# Patient Record
Sex: FEMALE | Race: WHITE | Employment: OTHER | URBAN - METROPOLITAN AREA
[De-identification: names, ages, dates, MRNs, and addresses within clinical notes are randomized per-mention and may not be internally consistent; named-entity substitution may affect disease eponyms.]

---

## 2024-04-01 ENCOUNTER — OFFICE VISIT (OUTPATIENT)
Dept: FAMILY MEDICINE CLINIC | Age: 69
End: 2024-04-01
Payer: MEDICARE

## 2024-04-01 VITALS
OXYGEN SATURATION: 98 % | DIASTOLIC BLOOD PRESSURE: 80 MMHG | WEIGHT: 163.4 LBS | BODY MASS INDEX: 32.08 KG/M2 | SYSTOLIC BLOOD PRESSURE: 138 MMHG | HEART RATE: 101 BPM | HEIGHT: 60 IN

## 2024-04-01 DIAGNOSIS — R19.5 POSITIVE COLORECTAL CANCER SCREENING USING COLOGUARD TEST: ICD-10-CM

## 2024-04-01 DIAGNOSIS — K21.9 GASTROESOPHAGEAL REFLUX DISEASE WITHOUT ESOPHAGITIS: ICD-10-CM

## 2024-04-01 DIAGNOSIS — Z11.59 ENCOUNTER FOR SCREENING FOR OTHER VIRAL DISEASES: ICD-10-CM

## 2024-04-01 DIAGNOSIS — I10 PRIMARY HYPERTENSION: ICD-10-CM

## 2024-04-01 DIAGNOSIS — F32.5 MAJOR DEPRESSIVE DISORDER WITH SINGLE EPISODE, IN FULL REMISSION (HCC): ICD-10-CM

## 2024-04-01 DIAGNOSIS — G25.81 RESTLESS LEG SYNDROME: ICD-10-CM

## 2024-04-01 DIAGNOSIS — E55.9 VITAMIN D DEFICIENCY: ICD-10-CM

## 2024-04-01 DIAGNOSIS — E78.2 MIXED HYPERLIPIDEMIA: ICD-10-CM

## 2024-04-01 DIAGNOSIS — Z85.3 HISTORY OF BREAST CANCER: ICD-10-CM

## 2024-04-01 DIAGNOSIS — E11.42 TYPE 2 DIABETES MELLITUS WITH DIABETIC POLYNEUROPATHY, WITHOUT LONG-TERM CURRENT USE OF INSULIN (HCC): Primary | ICD-10-CM

## 2024-04-01 PROBLEM — E11.9 TYPE 2 DIABETES MELLITUS WITHOUT COMPLICATION, WITHOUT LONG-TERM CURRENT USE OF INSULIN (HCC): Status: ACTIVE | Noted: 2024-04-01

## 2024-04-01 LAB — HBA1C MFR BLD: 9.6 %

## 2024-04-01 PROCEDURE — 1123F ACP DISCUSS/DSCN MKR DOCD: CPT | Performed by: FAMILY MEDICINE

## 2024-04-01 PROCEDURE — 3075F SYST BP GE 130 - 139MM HG: CPT | Performed by: FAMILY MEDICINE

## 2024-04-01 PROCEDURE — 3046F HEMOGLOBIN A1C LEVEL >9.0%: CPT | Performed by: FAMILY MEDICINE

## 2024-04-01 PROCEDURE — 99204 OFFICE O/P NEW MOD 45 MIN: CPT | Performed by: FAMILY MEDICINE

## 2024-04-01 PROCEDURE — 83036 HEMOGLOBIN GLYCOSYLATED A1C: CPT | Performed by: FAMILY MEDICINE

## 2024-04-01 PROCEDURE — 3079F DIAST BP 80-89 MM HG: CPT | Performed by: FAMILY MEDICINE

## 2024-04-01 RX ORDER — EMPAGLIFLOZIN 25 MG/1
10 TABLET, FILM COATED ORAL DAILY
COMMUNITY
Start: 2024-03-05 | End: 2024-04-01

## 2024-04-01 RX ORDER — VENLAFAXINE HYDROCHLORIDE 75 MG/1
75 CAPSULE, EXTENDED RELEASE ORAL EVERY EVENING
COMMUNITY
Start: 2024-01-23

## 2024-04-01 RX ORDER — METFORMIN HYDROCHLORIDE 500 MG/1
1000 TABLET, EXTENDED RELEASE ORAL 2 TIMES DAILY
COMMUNITY
Start: 2024-03-13

## 2024-04-01 RX ORDER — GLIPIZIDE 5 MG/1
5 TABLET ORAL
COMMUNITY
Start: 2024-03-12

## 2024-04-01 RX ORDER — ATORVASTATIN CALCIUM 20 MG/1
20 TABLET, FILM COATED ORAL NIGHTLY
COMMUNITY
Start: 2024-03-30

## 2024-04-01 RX ORDER — CHOLECALCIFEROL (VITAMIN D3) 1250 MCG
50000 CAPSULE ORAL DAILY
COMMUNITY
End: 2024-04-03 | Stop reason: SDUPTHER

## 2024-04-01 RX ORDER — GABAPENTIN 100 MG/1
100 CAPSULE ORAL
COMMUNITY
Start: 2024-03-12

## 2024-04-01 RX ORDER — ROPINIROLE 0.5 MG/1
0.5 TABLET, FILM COATED ORAL NIGHTLY
COMMUNITY
Start: 2024-03-07

## 2024-04-01 RX ORDER — FUROSEMIDE 20 MG/1
20 TABLET ORAL 2 TIMES DAILY
COMMUNITY
Start: 2024-01-30

## 2024-04-01 RX ORDER — ATORVASTATIN CALCIUM 10 MG/1
10 TABLET, FILM COATED ORAL NIGHTLY
COMMUNITY
Start: 2024-02-05 | End: 2024-04-01

## 2024-04-01 RX ORDER — GLUCOSAMINE HCL/CHONDROITIN SU 500-400 MG
CAPSULE ORAL
Qty: 100 STRIP | Refills: 3 | Status: SHIPPED | OUTPATIENT
Start: 2024-04-01

## 2024-04-01 RX ORDER — FAMOTIDINE 20 MG/1
20 TABLET, FILM COATED ORAL 2 TIMES DAILY PRN
Qty: 60 TABLET | Refills: 0 | Status: SHIPPED | OUTPATIENT
Start: 2024-04-01

## 2024-04-01 RX ORDER — FENOFIBRATE 67 MG/1
67 CAPSULE ORAL
COMMUNITY
Start: 2024-02-26

## 2024-04-01 RX ORDER — LANCETS 30 GAUGE
EACH MISCELLANEOUS
Qty: 100 EACH | Refills: 3 | Status: SHIPPED | OUTPATIENT
Start: 2024-04-01

## 2024-04-01 RX ORDER — FAMOTIDINE 20 MG/1
TABLET, FILM COATED ORAL
Qty: 180 TABLET | OUTPATIENT
Start: 2024-04-01

## 2024-04-01 RX ORDER — ANASTROZOLE 1 MG/1
1 TABLET ORAL DAILY
COMMUNITY
End: 2024-04-01 | Stop reason: ALTCHOICE

## 2024-04-01 RX ORDER — CAPTOPRIL 12.5 MG/1
12.5 TABLET ORAL DAILY
COMMUNITY
Start: 2024-01-11

## 2024-04-01 RX ORDER — PANTOPRAZOLE SODIUM 40 MG/1
40 TABLET, DELAYED RELEASE ORAL DAILY
COMMUNITY
Start: 2024-02-29 | End: 2024-04-01 | Stop reason: ALTCHOICE

## 2024-04-01 SDOH — ECONOMIC STABILITY: TRANSPORTATION INSECURITY
IN THE PAST 12 MONTHS, HAS LACK OF TRANSPORTATION KEPT YOU FROM MEETINGS, WORK, OR FROM GETTING THINGS NEEDED FOR DAILY LIVING?: NO

## 2024-04-01 SDOH — ECONOMIC STABILITY: HOUSING INSECURITY
IN THE LAST 12 MONTHS, WAS THERE A TIME WHEN YOU DID NOT HAVE A STEADY PLACE TO SLEEP OR SLEPT IN A SHELTER (INCLUDING NOW)?: NO

## 2024-04-01 SDOH — ECONOMIC STABILITY: INCOME INSECURITY: IN THE LAST 12 MONTHS, WAS THERE A TIME WHEN YOU WERE NOT ABLE TO PAY THE MORTGAGE OR RENT ON TIME?: NO

## 2024-04-01 SDOH — ECONOMIC STABILITY: FOOD INSECURITY: WITHIN THE PAST 12 MONTHS, THE FOOD YOU BOUGHT JUST DIDN'T LAST AND YOU DIDN'T HAVE MONEY TO GET MORE.: NEVER TRUE

## 2024-04-01 SDOH — ECONOMIC STABILITY: FOOD INSECURITY: WITHIN THE PAST 12 MONTHS, YOU WORRIED THAT YOUR FOOD WOULD RUN OUT BEFORE YOU GOT MONEY TO BUY MORE.: NEVER TRUE

## 2024-04-01 SDOH — HEALTH STABILITY: PHYSICAL HEALTH: ON AVERAGE, HOW MANY MINUTES DO YOU ENGAGE IN EXERCISE AT THIS LEVEL?: 0 MIN

## 2024-04-01 SDOH — HEALTH STABILITY: PHYSICAL HEALTH: ON AVERAGE, HOW MANY DAYS PER WEEK DO YOU ENGAGE IN MODERATE TO STRENUOUS EXERCISE (LIKE A BRISK WALK)?: 0 DAYS

## 2024-04-01 SDOH — ECONOMIC STABILITY: TRANSPORTATION INSECURITY
IN THE PAST 12 MONTHS, HAS THE LACK OF TRANSPORTATION KEPT YOU FROM MEDICAL APPOINTMENTS OR FROM GETTING MEDICATIONS?: NO

## 2024-04-01 ASSESSMENT — ENCOUNTER SYMPTOMS
TROUBLE SWALLOWING: 0
VOMITING: 0
BLOOD IN STOOL: 0
STRIDOR: 0
BACK PAIN: 1
COUGH: 0
RHINORRHEA: 0
COLOR CHANGE: 0
RECTAL PAIN: 0
CONSTIPATION: 0
SORE THROAT: 0
CHEST TIGHTNESS: 0
NAUSEA: 0
SHORTNESS OF BREATH: 0
SINUS PRESSURE: 0
ABDOMINAL PAIN: 0
WHEEZING: 0

## 2024-04-01 ASSESSMENT — LIFESTYLE VARIABLES
HOW OFTEN DO YOU HAVE A DRINK CONTAINING ALCOHOL: NEVER
HOW MANY STANDARD DRINKS CONTAINING ALCOHOL DO YOU HAVE ON A TYPICAL DAY: PATIENT DOES NOT DRINK

## 2024-04-01 ASSESSMENT — PATIENT HEALTH QUESTIONNAIRE - PHQ9
SUM OF ALL RESPONSES TO PHQ9 QUESTIONS 1 & 2: 0
1. LITTLE INTEREST OR PLEASURE IN DOING THINGS: NOT AT ALL
SUM OF ALL RESPONSES TO PHQ9 QUESTIONS 1 & 2: 0
SUM OF ALL RESPONSES TO PHQ QUESTIONS 1-9: 0
2. FEELING DOWN, DEPRESSED OR HOPELESS: NOT AT ALL
2. FEELING DOWN, DEPRESSED OR HOPELESS: NOT AT ALL
1. LITTLE INTEREST OR PLEASURE IN DOING THINGS: NOT AT ALL
SUM OF ALL RESPONSES TO PHQ QUESTIONS 1-9: 0

## 2024-04-01 ASSESSMENT — SOCIAL DETERMINANTS OF HEALTH (SDOH)
WITHIN THE LAST YEAR, HAVE YOU BEEN HUMILIATED OR EMOTIONALLY ABUSED IN OTHER WAYS BY YOUR PARTNER OR EX-PARTNER?: NO
WITHIN THE LAST YEAR, HAVE YOU BEEN KICKED, HIT, SLAPPED, OR OTHERWISE PHYSICALLY HURT BY YOUR PARTNER OR EX-PARTNER?: NO
HOW HARD IS IT FOR YOU TO PAY FOR THE VERY BASICS LIKE FOOD, HOUSING, MEDICAL CARE, AND HEATING?: NOT HARD AT ALL
WITHIN THE LAST YEAR, HAVE YOU BEEN AFRAID OF YOUR PARTNER OR EX-PARTNER?: NO
WITHIN THE LAST YEAR, HAVE TO BEEN RAPED OR FORCED TO HAVE ANY KIND OF SEXUAL ACTIVITY BY YOUR PARTNER OR EX-PARTNER?: NO

## 2024-04-01 NOTE — PATIENT INSTRUCTIONS
Dear valued patient,    We hope this message finds you in good health. At [ ], we are committed to providing you with the best possible healthcare experience. To further enhance your convenience and streamline your access to our services, we would like to introduce you to the benefits of utilizing direct scheduling through the [facility].    Direct scheduling is a feature within the [facility] that allows you to schedule appointments with your healthcare provider directly, without the need to make a phone call or wait for a callback. We understand that your time is jairo, and we want to ensure that you have quick and easy access to our services whenever you need them.  Here are some reasons why you should consider utilizing direct scheduling through the [facility]:    1. Convenience: With direct scheduling, you can book appointments at any time that suits you best, 24 hours a day, 7 days a week. No more waiting on hold or playing phone tag with our office staff. It puts you in control of managing your healthcare appointments effortlessly.    2. Accessibility: The [facility] is accessible through your computer, smartphone, or tablet, allowing you to schedule appointments from the comfort of your home, office, or on the go. You can access your medical records, review test results, and communicate with your healthcare provider all in one secure and user-friendly platform.    3. Timesaving: By utilizing direct scheduling, you can avoid unnecessary delays and save jairo time. You can easily browse the available appointment slots and select the one that works best for you, eliminating the back-and-forth communication typically required when scheduling via phone.    4. Reminders and notifications: [facility] sends automatic reminders for upcoming appointments, ensuring that you never miss an important visit. You can also receive notifications about test

## 2024-04-01 NOTE — PROGRESS NOTES
Chief Complaint   Patient presents with    New Patient    Other     COLOGUARD WAS ABNORMAL          Alma Strickland  here today for follow up on chronic medical problems, go over labs and/or diagnostic studies, and medication refills. New Patient and Other (COLOGUARD WAS ABNORMAL )      HPI: Patient is scheduled to establish.  Patient reports she recently more from [facility] 1 week before to live with her daughter.    The patient has multiple medical problems, type 2 diabetes uncontrolled A1c is 5.6 and reports it was 10.5 before.  She is currently on metformin, she is on glipizide and also on Jardiance.  Patient reports she has not started Jardiance yet, she is scared about the side effects.  Patient is compliant with metformin and glipizide does not monitor her blood sugars on regular basis.  Patient reports she needs a new monitor because of the previous monitor was not working.  She is up-to-date on eye and foot exam, needs to get the results from her previous PCP.      Hypertension controlled is currently on captopril, reports compliance denies any side effects.  Patient also takes Lasix 20 mg.        Patient is currently on gabapentin small dose for diabetic neuropathy, patient denies any side effects from medication.      Hyperlipidemia on statins and also on fenofibrate's no recent blood work.  Discussed with patient we might discontinue fenofibrate if lipid panel is normal.      Depression stable is currently on Effexor denies any side effects.  Patient reports compliance with medications.      Patient reports she has recent Cologuard test done and that was abnormal.  I could not find the results reports previously her Cologuard test results were normal.      Patient has history of breast malignancy, and has lumpectomy done.  Patient reports she was also on chemotherapy for long-term till 2016.  Patient has regular mammograms yearly.      Restless leg syndrome is currently on Requip.       GERD, patient

## 2024-04-01 NOTE — PROGRESS NOTES
Visit Information    Have you changed or started any medications since your last visit including any over-the-counter medicines, vitamins, or herbal medicines? no   Are you having any side effects from any of your medications? -  no  Have you stopped taking any of your medications? Is so, why? -  no    Have you seen any other physician or provider since your last visit? No  Have you had any other diagnostic tests since your last visit? No  Have you been seen in the emergency room and/or had an admission to a hospital since we last saw you? No  Have you had your routine dental cleaning in the past 6 months? yes     Have you activated your [employer] account? If not, what are your barriers? Yes     Patient Care Team:  Les Walker MD as PCP - General (Family Medicine)    Medical History Review  Past Medical, Family, and Social History reviewed and does contribute to the patient presenting condition    Health Maintenance   Topic Date Due    COVID-19 Vaccine (1) Never done    Lipids  Never done    Depression Screen  Never done    Hepatitis C screen  Never done    DTaP/Tdap/Td vaccine (1 - Tdap) Never done    Colorectal Cancer Screen  Never done    Breast cancer screen  Never done    Shingles vaccine (1 of 2) Never done    DEXA (modify frequency per FRAX score)  Never done    Respiratory Syncytial Virus (RSV) Pregnant or age 60 yrs+ (1 - 1-dose 60+ series) Never done    Pneumococcal 65+ years Vaccine (1 of 1 - PCV) Never done    Annual Wellness Visit (Medicare Advantage)  Never done    Flu vaccine (Season Ended) 08/01/2024    Hepatitis A vaccine  Aged Out    Hepatitis B vaccine  Aged Out    Hib vaccine  Aged Out    Polio vaccine  Aged Out    Meningococcal (ACWY) vaccine  Aged Out

## 2024-04-02 ENCOUNTER — TELEPHONE (OUTPATIENT)
Dept: ADMINISTRATIVE | Age: 69
End: 2024-04-02

## 2024-04-02 NOTE — TELEPHONE ENCOUNTER
Patient has a referral to get a colonoscopy scheduled from Dr Les Walker    Anyday or time is okay with patient    Please call

## 2024-04-03 ENCOUNTER — HOSPITAL ENCOUNTER (OUTPATIENT)
Age: 69
Discharge: HOME | End: 2024-04-03
Payer: MEDICARE

## 2024-04-03 DIAGNOSIS — E11.42 TYPE 2 DIABETES MELLITUS WITH DIABETIC POLYNEUROPATHY, WITHOUT LONG-TERM CURRENT USE OF INSULIN (HCC): ICD-10-CM

## 2024-04-03 DIAGNOSIS — E55.9 VITAMIN D DEFICIENCY: ICD-10-CM

## 2024-04-03 DIAGNOSIS — I10 PRIMARY HYPERTENSION: ICD-10-CM

## 2024-04-03 DIAGNOSIS — Z11.59 ENCOUNTER FOR SCREENING FOR OTHER VIRAL DISEASES: ICD-10-CM

## 2024-04-03 LAB
25(OH)D3 SERPL-MCNC: 23.4 NG/ML (ref 30–100)
ALBUMIN SERPL-MCNC: 4.1 G/DL (ref 3.5–5.2)
ALP SERPL-CCNC: 90 U/L (ref 35–104)
ALT SERPL-CCNC: 21 U/L (ref 5–33)
ANION GAP SERPL CALCULATED.3IONS-SCNC: 16 MMOL/L (ref 9–17)
AST SERPL-CCNC: 20 U/L
BASOPHILS # BLD: 0.1 K/UL (ref 0–0.2)
BASOPHILS NFR BLD: 1 % (ref 0–2)
BILIRUB SERPL-MCNC: 0.6 MG/DL (ref 0.3–1.2)
BILIRUB UR QL STRIP: NEGATIVE
BUN SERPL-MCNC: 19 MG/DL (ref 8–23)
CALCIUM SERPL-MCNC: 9.3 MG/DL (ref 8.6–10.4)
CHLORIDE SERPL-SCNC: 100 MMOL/L (ref 98–107)
CHOLEST SERPL-MCNC: 178 MG/DL
CHOLESTEROL/HDL RATIO: 2.9
CLARITY UR: CLEAR
CO2 SERPL-SCNC: 23 MMOL/L (ref 20–31)
COLOR UR: YELLOW
COMMENT: ABNORMAL
CREAT SERPL-MCNC: 0.7 MG/DL (ref 0.5–0.9)
CREAT UR-MCNC: 70 MG/DL (ref 28–217)
EOSINOPHIL # BLD: 0.1 K/UL (ref 0–0.4)
EOSINOPHILS RELATIVE PERCENT: 1 % (ref 0–4)
ERYTHROCYTE [DISTWIDTH] IN BLOOD BY AUTOMATED COUNT: 14 % (ref 11.5–14.9)
FOLATE SERPL-MCNC: 11.4 NG/ML (ref 4.8–24.2)
GFR SERPL CREATININE-BSD FRML MDRD: >90 ML/MIN/1.73M2
GLUCOSE SERPL-MCNC: 157 MG/DL (ref 70–99)
GLUCOSE UR STRIP-MCNC: ABNORMAL MG/DL
HCT VFR BLD AUTO: 41.4 % (ref 36–46)
HCV AB SERPL QL IA: NONREACTIVE
HDLC SERPL-MCNC: 62 MG/DL
HGB BLD-MCNC: 13.4 G/DL (ref 12–16)
HGB UR QL STRIP.AUTO: NEGATIVE
KETONES UR STRIP-MCNC: NEGATIVE MG/DL
LDLC SERPL CALC-MCNC: 81 MG/DL (ref 0–130)
LEUKOCYTE ESTERASE UR QL STRIP: NEGATIVE
LYMPHOCYTES NFR BLD: 2 K/UL (ref 1–4.8)
LYMPHOCYTES RELATIVE PERCENT: 26 % (ref 24–44)
MAGNESIUM SERPL-MCNC: 2.1 MG/DL (ref 1.6–2.6)
MCH RBC QN AUTO: 28.7 PG (ref 26–34)
MCHC RBC AUTO-ENTMCNC: 32.4 G/DL (ref 31–37)
MCV RBC AUTO: 88.7 FL (ref 80–100)
MICROALBUMIN UR-MCNC: <12 MG/L (ref 0–20)
MICROALBUMIN/CREAT UR-RTO: NORMAL MCG/MG CREAT (ref 0–25)
MONOCYTES NFR BLD: 0.6 K/UL (ref 0.1–1.3)
MONOCYTES NFR BLD: 7 % (ref 1–7)
NEUTROPHILS NFR BLD: 65 % (ref 36–66)
NEUTS SEG NFR BLD: 5 K/UL (ref 1.3–9.1)
NITRITE UR QL STRIP: NEGATIVE
PH UR STRIP: 5.5 [PH] (ref 5–8)
PLATELET # BLD AUTO: 378 K/UL (ref 150–450)
PMV BLD AUTO: 8.2 FL (ref 6–12)
POTASSIUM SERPL-SCNC: 4.2 MMOL/L (ref 3.7–5.3)
PROT SERPL-MCNC: 7.1 G/DL (ref 6.4–8.3)
PROT UR STRIP-MCNC: NEGATIVE MG/DL
RBC # BLD AUTO: 4.66 M/UL (ref 4–5.2)
SODIUM SERPL-SCNC: 139 MMOL/L (ref 135–144)
SP GR UR STRIP: 1.03 (ref 1–1.03)
TRIGL SERPL-MCNC: 177 MG/DL
TSH SERPL DL<=0.05 MIU/L-ACNC: 2.52 UIU/ML (ref 0.3–5)
URATE SERPL-MCNC: 4.5 MG/DL (ref 2.4–5.7)
UROBILINOGEN UR STRIP-ACNC: NORMAL EU/DL (ref 0–1)
VIT B12 SERPL-MCNC: 1659 PG/ML (ref 232–1245)
WBC OTHER # BLD: 7.7 K/UL (ref 3.5–11)

## 2024-04-03 PROCEDURE — 82306 VITAMIN D 25 HYDROXY: CPT

## 2024-04-03 PROCEDURE — 86803 HEPATITIS C AB TEST: CPT

## 2024-04-03 PROCEDURE — 80061 LIPID PANEL: CPT

## 2024-04-03 PROCEDURE — 80053 COMPREHEN METABOLIC PANEL: CPT

## 2024-04-03 PROCEDURE — 36415 COLL VENOUS BLD VENIPUNCTURE: CPT

## 2024-04-03 PROCEDURE — 82043 UR ALBUMIN QUANTITATIVE: CPT

## 2024-04-03 PROCEDURE — 82607 VITAMIN B-12: CPT

## 2024-04-03 PROCEDURE — 84443 ASSAY THYROID STIM HORMONE: CPT

## 2024-04-03 PROCEDURE — 85025 COMPLETE CBC W/AUTO DIFF WBC: CPT

## 2024-04-03 PROCEDURE — 82746 ASSAY OF FOLIC ACID SERUM: CPT

## 2024-04-03 PROCEDURE — 84550 ASSAY OF BLOOD/URIC ACID: CPT

## 2024-04-03 PROCEDURE — 83735 ASSAY OF MAGNESIUM: CPT

## 2024-04-03 PROCEDURE — 82570 ASSAY OF URINE CREATININE: CPT

## 2024-04-03 PROCEDURE — 81003 URINALYSIS AUTO W/O SCOPE: CPT

## 2024-04-03 RX ORDER — CHOLECALCIFEROL (VITAMIN D3) 1250 MCG
50000 CAPSULE ORAL WEEKLY
Qty: 12 CAPSULE | Refills: 1 | Status: SHIPPED | OUTPATIENT
Start: 2024-04-03

## 2024-05-03 ENCOUNTER — TELEPHONE (OUTPATIENT)
Dept: FAMILY MEDICINE CLINIC | Age: 69
End: 2024-05-03

## 2024-05-03 DIAGNOSIS — E11.42 TYPE 2 DIABETES MELLITUS WITH DIABETIC POLYNEUROPATHY, WITHOUT LONG-TERM CURRENT USE OF INSULIN (HCC): Primary | ICD-10-CM

## 2024-05-03 NOTE — TELEPHONE ENCOUNTER
Please Approve or Refuse.  Send to Pharmacy per Pt's Request:      Next Visit Date:  7/15/2024   Last Visit Date: 4/1/2024    Hemoglobin A1C (%)   Date Value   04/01/2024 9.6             ( goal A1C is < 7)   BP Readings from Last 3 Encounters:   04/01/24 138/80          (goal 120/80)  BUN   Date Value Ref Range Status   04/03/2024 19 8 - 23 mg/dL Final     Creatinine   Date Value Ref Range Status   04/03/2024 0.7 0.5 - 0.9 mg/dL Final     Potassium   Date Value Ref Range Status   04/03/2024 4.2 3.7 - 5.3 mmol/L Final

## 2024-05-03 NOTE — TELEPHONE ENCOUNTER
Pt came into office stating that [employer] is requesting a new order for a One Touch Verio sent to [facility].

## 2024-05-07 RX ORDER — BLOOD-GLUCOSE METER
1 EACH MISCELLANEOUS DAILY
Qty: 1 KIT | Refills: 0 | Status: SHIPPED | OUTPATIENT
Start: 2024-05-07

## 2024-05-07 NOTE — TELEPHONE ENCOUNTER
1. Type 2 diabetes mellitus with diabetic polyneuropathy, without long-term current use of insulin (HCC)    - Handicap placard  - blood glucose test strips (ASCENSIA AUTODISC VI;ONE TOUCH ULTRA TEST VI) strip; 1 each by In Vitro route daily As needed.  Dispense: 100 each; Refill: 3  - Blood Glucose Monitoring Suppl (ONE TOUCH ULTRA 2) w/Device KIT; 1 kit by Does not apply route daily  Dispense: 1 kit; Refill: 0

## 2024-05-15 DIAGNOSIS — K21.9 GASTROESOPHAGEAL REFLUX DISEASE WITHOUT ESOPHAGITIS: ICD-10-CM

## 2024-05-16 DIAGNOSIS — K21.9 GASTROESOPHAGEAL REFLUX DISEASE WITHOUT ESOPHAGITIS: ICD-10-CM

## 2024-05-16 RX ORDER — FAMOTIDINE 20 MG/1
TABLET, FILM COATED ORAL
Qty: 180 TABLET | OUTPATIENT
Start: 2024-05-16

## 2024-05-16 RX ORDER — FAMOTIDINE 20 MG/1
TABLET, FILM COATED ORAL
Qty: 60 TABLET | Refills: 0 | Status: SHIPPED | OUTPATIENT
Start: 2024-05-16

## 2024-06-12 DIAGNOSIS — K21.9 GASTROESOPHAGEAL REFLUX DISEASE WITHOUT ESOPHAGITIS: ICD-10-CM

## 2024-06-12 RX ORDER — FAMOTIDINE 20 MG/1
TABLET, FILM COATED ORAL
Qty: 60 TABLET | Refills: 3 | Status: SHIPPED | OUTPATIENT
Start: 2024-06-12

## 2024-06-19 ENCOUNTER — HOSPITAL ENCOUNTER (OUTPATIENT)
Age: 69
Discharge: HOME | End: 2024-06-19
Payer: COMMERCIAL

## 2024-06-19 ENCOUNTER — HOSPITAL ENCOUNTER (OUTPATIENT)
Age: 69
Setting detail: SPECIMEN
Discharge: HOME | End: 2024-06-19
Payer: COMMERCIAL

## 2024-06-19 DIAGNOSIS — Z51.81 THERAPEUTIC DRUG MONITORING: ICD-10-CM

## 2024-06-19 DIAGNOSIS — I10 PRIMARY HYPERTENSION: ICD-10-CM

## 2024-06-19 DIAGNOSIS — E11.42 TYPE 2 DIABETES MELLITUS WITH DIABETIC POLYNEUROPATHY, WITHOUT LONG-TERM CURRENT USE OF INSULIN (HCC): Primary | ICD-10-CM

## 2024-06-19 DIAGNOSIS — E78.2 MIXED HYPERLIPIDEMIA: ICD-10-CM

## 2024-06-19 DIAGNOSIS — E55.9 VITAMIN D DEFICIENCY: ICD-10-CM

## 2024-06-19 DIAGNOSIS — G25.81 RESTLESS LEG SYNDROME: ICD-10-CM

## 2024-06-19 DIAGNOSIS — E11.42 TYPE 2 DIABETES MELLITUS WITH DIABETIC POLYNEUROPATHY, WITHOUT LONG-TERM CURRENT USE OF INSULIN (HCC): ICD-10-CM

## 2024-06-19 LAB
25(OH)D3 SERPL-MCNC: 62.7 NG/ML (ref 30–100)
ALBUMIN SERPL-MCNC: 4.4 G/DL (ref 3.5–5.2)
ALP SERPL-CCNC: 80 U/L (ref 35–104)
ALT SERPL-CCNC: 21 U/L (ref 5–33)
ANION GAP SERPL CALCULATED.3IONS-SCNC: 15 MMOL/L (ref 9–17)
AST SERPL-CCNC: 15 U/L
BASOPHILS # BLD: 0 K/UL (ref 0–0.2)
BASOPHILS NFR BLD: 0 % (ref 0–2)
BILIRUB SERPL-MCNC: 0.4 MG/DL (ref 0.3–1.2)
BUN SERPL-MCNC: 18 MG/DL (ref 8–23)
CALCIUM SERPL-MCNC: 9.3 MG/DL (ref 8.6–10.4)
CHLORIDE SERPL-SCNC: 103 MMOL/L (ref 98–107)
CHOLEST SERPL-MCNC: 212 MG/DL
CHOLESTEROL/HDL RATIO: 3.2
CO2 SERPL-SCNC: 20 MMOL/L (ref 20–31)
CREAT SERPL-MCNC: 0.7 MG/DL (ref 0.5–0.9)
CREAT UR-MCNC: 138 MG/DL (ref 28–217)
EOSINOPHIL # BLD: 0.1 K/UL (ref 0–0.4)
EOSINOPHILS RELATIVE PERCENT: 2 % (ref 0–4)
ERYTHROCYTE [DISTWIDTH] IN BLOOD BY AUTOMATED COUNT: 13.7 % (ref 11.5–14.9)
FOLATE SERPL-MCNC: 12.5 NG/ML (ref 4.8–24.2)
GFR, ESTIMATED: >90 ML/MIN/1.73M2
GLUCOSE SERPL-MCNC: 211 MG/DL (ref 70–99)
HCT VFR BLD AUTO: 41.2 % (ref 36–46)
HDLC SERPL-MCNC: 67 MG/DL
HGB BLD-MCNC: 13.9 G/DL (ref 12–16)
LDLC SERPL CALC-MCNC: 99 MG/DL (ref 0–130)
LYMPHOCYTES NFR BLD: 1.8 K/UL (ref 1–4.8)
LYMPHOCYTES RELATIVE PERCENT: 25 % (ref 24–44)
MAGNESIUM SERPL-MCNC: 2 MG/DL (ref 1.6–2.6)
MCH RBC QN AUTO: 29.7 PG (ref 26–34)
MCHC RBC AUTO-ENTMCNC: 33.7 G/DL (ref 31–37)
MCV RBC AUTO: 88.1 FL (ref 80–100)
MICROALBUMIN UR-MCNC: 22 MG/L (ref 0–20)
MICROALBUMIN/CREAT UR-RTO: 16 MCG/MG CREAT (ref 0–25)
MONOCYTES NFR BLD: 0.7 K/UL (ref 0.1–1.3)
MONOCYTES NFR BLD: 9 % (ref 1–7)
NEUTROPHILS NFR BLD: 64 % (ref 36–66)
NEUTS SEG NFR BLD: 4.6 K/UL (ref 1.3–9.1)
PLATELET # BLD AUTO: 376 K/UL (ref 150–450)
PMV BLD AUTO: 7.2 FL (ref 6–12)
POTASSIUM SERPL-SCNC: 4 MMOL/L (ref 3.7–5.3)
PROT SERPL-MCNC: 7.4 G/DL (ref 6.4–8.3)
RBC # BLD AUTO: 4.68 M/UL (ref 4–5.2)
SODIUM SERPL-SCNC: 138 MMOL/L (ref 135–144)
TRIGL SERPL-MCNC: 228 MG/DL
VIT B12 SERPL-MCNC: 1188 PG/ML (ref 232–1245)
WBC OTHER # BLD: 7.2 K/UL (ref 3.5–11)

## 2024-06-19 PROCEDURE — 36415 COLL VENOUS BLD VENIPUNCTURE: CPT

## 2024-06-19 PROCEDURE — G0481 DRUG TEST DEF 8-14 CLASSES: HCPCS

## 2024-06-19 PROCEDURE — 80061 LIPID PANEL: CPT

## 2024-06-19 PROCEDURE — 83735 ASSAY OF MAGNESIUM: CPT

## 2024-06-19 PROCEDURE — 82607 VITAMIN B-12: CPT

## 2024-06-19 PROCEDURE — 82570 ASSAY OF URINE CREATININE: CPT

## 2024-06-19 PROCEDURE — 80307 DRUG TEST PRSMV CHEM ANLYZR: CPT

## 2024-06-19 PROCEDURE — 85025 COMPLETE CBC W/AUTO DIFF WBC: CPT

## 2024-06-19 PROCEDURE — 80053 COMPREHEN METABOLIC PANEL: CPT

## 2024-06-19 PROCEDURE — 82746 ASSAY OF FOLIC ACID SERUM: CPT

## 2024-06-19 PROCEDURE — 82043 UR ALBUMIN QUANTITATIVE: CPT

## 2024-06-19 PROCEDURE — 82306 VITAMIN D 25 HYDROXY: CPT

## 2024-06-19 RX ORDER — GABAPENTIN 100 MG/1
100 CAPSULE ORAL 3 TIMES DAILY
Qty: 90 CAPSULE | Refills: 0 | OUTPATIENT
Start: 2024-06-19

## 2024-06-19 RX ORDER — ROPINIROLE 0.5 MG/1
0.5 TABLET, FILM COATED ORAL NIGHTLY
Qty: 90 TABLET | Refills: 3 | OUTPATIENT
Start: 2024-06-19

## 2024-06-19 RX ORDER — GABAPENTIN 100 MG/1
100 CAPSULE ORAL 3 TIMES DAILY
Qty: 90 CAPSULE | Refills: 0 | Status: SHIPPED | OUTPATIENT
Start: 2024-06-19 | End: 2024-07-19

## 2024-06-19 RX ORDER — ROPINIROLE 0.5 MG/1
0.5 TABLET, FILM COATED ORAL NIGHTLY
Qty: 90 TABLET | Refills: 2 | Status: SHIPPED | OUTPATIENT
Start: 2024-06-19

## 2024-06-19 NOTE — TELEPHONE ENCOUNTER
Spoke to patient she said since she has moved here from [facility] she would need dr lopez to fill them, or refer her to someone that will

## 2024-06-19 NOTE — TELEPHONE ENCOUNTER
I will refill the medication for her, urine drug screen has been ordered please ensure she gets this completed.  I also would like her to come into the office to fill out a medication contract.

## 2024-06-22 LAB
6-ACETYLMORPHINE, UR: NOT DETECTED
7-AMINOCLONAZEPAM, URINE: NOT DETECTED
ALPHA-OH-ALPRAZ, URINE: NOT DETECTED
ALPHA-OH-MIDAZOLAM, URINE: NOT DETECTED
ALPRAZOLAM, URINE: NOT DETECTED
AMPHETAMINE, URINE: NOT DETECTED
BARBITURATES, URINE: NEGATIVE
BENZOYLECGONINE, UR: NEGATIVE
BUPRENORPHINE URINE: NOT DETECTED
CARISOPRODOL, UR: NEGATIVE
CLONAZEPAM, URINE: NOT DETECTED
CODEINE, URINE: NOT DETECTED
CREAT UR-MCNC: 118.8 MG/DL (ref 20–400)
DIAZEPAM, URINE: NOT DETECTED
DRUGS EXPECTED, UR: NORMAL
EER HI RES INTERP UR: NORMAL
ETHYL GLUCURONIDE UR: NEGATIVE
FENTANYL URINE: NOT DETECTED
GABAPENTIN: NOT DETECTED
HYDROCODONE, URINE: NOT DETECTED
HYDROMORPHONE, URINE: NOT DETECTED
LORAZEPAM, URINE: NOT DETECTED
MARIJUANA METAB, UR: NEGATIVE
MDA, UR: NOT DETECTED
MDEA, EVE, UR: NOT DETECTED
MDMA URINE: NOT DETECTED
MEPERIDINE METAB, UR: NOT DETECTED
METHADONE, URINE: NEGATIVE
METHAMPHETAMINE, URINE: NOT DETECTED
METHYLPHENIDATE: NOT DETECTED
MIDAZOLAM, URINE: NOT DETECTED
MORPHINE, OPI1M: NOT DETECTED
NALOXONE URINE: NOT DETECTED
NORBUPRENORPHINE, URINE: NOT DETECTED
NORDIAZEPAM, URINE: NOT DETECTED
NORFENTANYL, URINE: NOT DETECTED
NORHYDROCODONE, URINE: NOT DETECTED
NOROXYCODONE, URINE: NOT DETECTED
NOROXYMORPHONE, URINE: NOT DETECTED
OXAZEPAM, URINE: NOT DETECTED
OXYCODONE URINE: NOT DETECTED
OXYMORPHONE, URINE: NOT DETECTED
PAIN MANAGEMENT DRUG PANEL INTERP, URINE: NORMAL
PAIN MGT DRUG PANEL, HI RES, UR: NORMAL
PCP,URINE: NEGATIVE
PHENTERMINE, UR: NOT DETECTED
PREGABALIN: NOT DETECTED
TAPENTADOL, URINE: NOT DETECTED
TAPENTADOL-O-SULFATE, URINE: NOT DETECTED
TEMAZEPAM, URINE: NOT DETECTED
TRAMADOL, URINE: NEGATIVE
ZOLPIDEM METABOLITE (ZCA), URINE: NOT DETECTED
ZOLPIDEM, URINE: NOT DETECTED

## 2024-07-15 ENCOUNTER — OFFICE VISIT (OUTPATIENT)
Dept: FAMILY MEDICINE CLINIC | Age: 69
End: 2024-07-15
Payer: COMMERCIAL

## 2024-07-15 VITALS
TEMPERATURE: 98.2 F | OXYGEN SATURATION: 97 % | WEIGHT: 149.8 LBS | DIASTOLIC BLOOD PRESSURE: 80 MMHG | HEART RATE: 100 BPM | HEIGHT: 60 IN | BODY MASS INDEX: 29.41 KG/M2 | SYSTOLIC BLOOD PRESSURE: 118 MMHG

## 2024-07-15 DIAGNOSIS — E11.42 TYPE 2 DIABETES MELLITUS WITH DIABETIC POLYNEUROPATHY, WITHOUT LONG-TERM CURRENT USE OF INSULIN (HCC): ICD-10-CM

## 2024-07-15 DIAGNOSIS — R19.5 POSITIVE COLORECTAL CANCER SCREENING USING COLOGUARD TEST: ICD-10-CM

## 2024-07-15 DIAGNOSIS — Z78.0 POST-MENOPAUSAL: ICD-10-CM

## 2024-07-15 DIAGNOSIS — Z00.00 WELCOME TO MEDICARE PREVENTIVE VISIT: Primary | ICD-10-CM

## 2024-07-15 DIAGNOSIS — Z12.31 SCREENING MAMMOGRAM FOR HIGH-RISK PATIENT: ICD-10-CM

## 2024-07-15 DIAGNOSIS — I10 PRIMARY HYPERTENSION: ICD-10-CM

## 2024-07-15 LAB — HBA1C MFR BLD: 8.8 %

## 2024-07-15 PROCEDURE — 3074F SYST BP LT 130 MM HG: CPT | Performed by: FAMILY MEDICINE

## 2024-07-15 PROCEDURE — G0402 INITIAL PREVENTIVE EXAM: HCPCS | Performed by: FAMILY MEDICINE

## 2024-07-15 PROCEDURE — 1123F ACP DISCUSS/DSCN MKR DOCD: CPT | Performed by: FAMILY MEDICINE

## 2024-07-15 PROCEDURE — 83036 HEMOGLOBIN GLYCOSYLATED A1C: CPT | Performed by: FAMILY MEDICINE

## 2024-07-15 PROCEDURE — 3079F DIAST BP 80-89 MM HG: CPT | Performed by: FAMILY MEDICINE

## 2024-07-15 PROCEDURE — 99214 OFFICE O/P EST MOD 30 MIN: CPT | Performed by: FAMILY MEDICINE

## 2024-07-15 PROCEDURE — 3052F HG A1C>EQUAL 8.0%<EQUAL 9.0%: CPT | Performed by: FAMILY MEDICINE

## 2024-07-15 RX ORDER — GLIPIZIDE 5 MG/1
5 TABLET ORAL
Qty: 180 TABLET | Refills: 2 | Status: SHIPPED | OUTPATIENT
Start: 2024-07-15

## 2024-07-15 RX ORDER — METFORMIN HYDROCHLORIDE 500 MG/1
1000 TABLET, EXTENDED RELEASE ORAL 2 TIMES DAILY
Qty: 60 TABLET | Refills: 2 | Status: SHIPPED | OUTPATIENT
Start: 2024-07-15

## 2024-07-15 RX ORDER — EMPAGLIFLOZIN 25 MG/1
25 TABLET, FILM COATED ORAL DAILY
COMMUNITY
Start: 2024-06-11

## 2024-07-15 RX ORDER — GLIPIZIDE 5 MG/1
5 TABLET ORAL
Qty: 60 TABLET | Refills: 1 | Status: SHIPPED | OUTPATIENT
Start: 2024-07-15 | End: 2024-07-15

## 2024-07-15 SDOH — ECONOMIC STABILITY: FOOD INSECURITY: WITHIN THE PAST 12 MONTHS, YOU WORRIED THAT YOUR FOOD WOULD RUN OUT BEFORE YOU GOT MONEY TO BUY MORE.: NEVER TRUE

## 2024-07-15 SDOH — ECONOMIC STABILITY: FOOD INSECURITY: WITHIN THE PAST 12 MONTHS, THE FOOD YOU BOUGHT JUST DIDN'T LAST AND YOU DIDN'T HAVE MONEY TO GET MORE.: NEVER TRUE

## 2024-07-15 SDOH — ECONOMIC STABILITY: INCOME INSECURITY: HOW HARD IS IT FOR YOU TO PAY FOR THE VERY BASICS LIKE FOOD, HOUSING, MEDICAL CARE, AND HEATING?: NOT HARD AT ALL

## 2024-07-15 ASSESSMENT — ENCOUNTER SYMPTOMS
RECTAL PAIN: 0
WHEEZING: 0
COLOR CHANGE: 0
NAUSEA: 0
TROUBLE SWALLOWING: 0
DIARRHEA: 0
STRIDOR: 0
SHORTNESS OF BREATH: 0
CONSTIPATION: 1
CHEST TIGHTNESS: 0
ABDOMINAL DISTENTION: 0
COUGH: 0
ABDOMINAL PAIN: 0
SINUS PRESSURE: 0
BLOOD IN STOOL: 0
BACK PAIN: 0
EYE REDNESS: 0
VOMITING: 0
SORE THROAT: 0
RHINORRHEA: 0

## 2024-07-15 ASSESSMENT — PATIENT HEALTH QUESTIONNAIRE - PHQ9
8. MOVING OR SPEAKING SO SLOWLY THAT OTHER PEOPLE COULD HAVE NOTICED. OR THE OPPOSITE, BEING SO FIGETY OR RESTLESS THAT YOU HAVE BEEN MOVING AROUND A LOT MORE THAN USUAL: NOT AT ALL
SUM OF ALL RESPONSES TO PHQ QUESTIONS 1-9: 0
5. POOR APPETITE OR OVEREATING: NOT AT ALL
10. IF YOU CHECKED OFF ANY PROBLEMS, HOW DIFFICULT HAVE THESE PROBLEMS MADE IT FOR YOU TO DO YOUR WORK, TAKE CARE OF THINGS AT HOME, OR GET ALONG WITH OTHER PEOPLE: NOT DIFFICULT AT ALL
SUM OF ALL RESPONSES TO PHQ QUESTIONS 1-9: 0
SUM OF ALL RESPONSES TO PHQ QUESTIONS 1-9: 0
2. FEELING DOWN, DEPRESSED OR HOPELESS: NOT AT ALL
7. TROUBLE CONCENTRATING ON THINGS, SUCH AS READING THE NEWSPAPER OR WATCHING TELEVISION: NOT AT ALL
SUM OF ALL RESPONSES TO PHQ9 QUESTIONS 1 & 2: 0
1. LITTLE INTEREST OR PLEASURE IN DOING THINGS: NOT AT ALL
3. TROUBLE FALLING OR STAYING ASLEEP: NOT AT ALL
SUM OF ALL RESPONSES TO PHQ QUESTIONS 1-9: 0
9. THOUGHTS THAT YOU WOULD BE BETTER OFF DEAD, OR OF HURTING YOURSELF: NOT AT ALL
6. FEELING BAD ABOUT YOURSELF - OR THAT YOU ARE A FAILURE OR HAVE LET YOURSELF OR YOUR FAMILY DOWN: NOT AT ALL
4. FEELING TIRED OR HAVING LITTLE ENERGY: NOT AT ALL

## 2024-07-15 ASSESSMENT — LIFESTYLE VARIABLES
HOW MANY STANDARD DRINKS CONTAINING ALCOHOL DO YOU HAVE ON A TYPICAL DAY: PATIENT DOES NOT DRINK
HOW OFTEN DO YOU HAVE A DRINK CONTAINING ALCOHOL: NEVER

## 2024-07-15 NOTE — PROGRESS NOTES
Visit Information    Have you changed or started any medications since your last visit including any over-the-counter medicines, vitamins, or herbal medicines? no   Have you stopped taking any of your medications? Is so, why? -  no  Are you having any side effects from any of your medications? - no    Have you seen any other physician or provider since your last visit?  no   Have you had any other diagnostic tests since your last visit?  no   Have you been seen in the emergency room and/or had an admission in a hospital since we last saw you?  no   Have you had your routine dental cleaning in the past 6 months?  no     Do you have an active MyChart account? If no, what is the barrier?  Yes    Patient Care Team:  Les Walker MD as PCP - General (Family Medicine)  Les Walker MD as PCP - Empaneled Provider    Medical History Review  Past Medical, Family, and Social History reviewed and does contribute to the patient presenting condition    Health Maintenance   Topic Date Due    Diabetic foot exam  Never done    Diabetic retinal exam  Never done    DTaP/Tdap/Td vaccine (1 - Tdap) Never done    Breast cancer screen  Never done    Colorectal Cancer Screen  Never done    Shingles vaccine (1 of 2) Never done    DEXA (modify frequency per FRAX score)  Never done    Respiratory Syncytial Virus (RSV) Pregnant or age 60 yrs+ (1 - 1-dose 60+ series) Never done    COVID-19 Vaccine (4 - 2023-24 season) 09/01/2023    Annual Wellness Visit (Medicare Advantage)  Never done    A1C test (Diabetic or Prediabetic)  07/01/2024    Flu vaccine (1) 08/01/2024    Depression Monitoring  04/01/2025    Diabetic Alb to Cr ratio (uACR) test  06/19/2025    Lipids  06/19/2025    GFR test (Diabetes, CKD 3-4, OR last GFR 15-59)  06/19/2025    Pneumococcal 65+ years Vaccine  Completed    Hepatitis C screen  Completed    Hepatitis A vaccine  Aged Out    Hepatitis B vaccine  Aged Out    Hib vaccine  Aged Out    Polio vaccine  Aged Out    
  With correction         Vitals:    07/15/24 1228   BP: 118/80   Pulse: 100   Temp: 98.2 °F (36.8 °C)   SpO2: 97%   Weight: 67.9 kg (149 lb 12.8 oz)   Height: 1.524 m (5')      Body mass index is 29.26 kg/m².      Physical Exam  Vitals and nursing note reviewed.   Constitutional:       General: She is not in acute distress.     Appearance: Normal appearance. She is well-developed. She is not diaphoretic.   HENT:      Head: Normocephalic and atraumatic.   Eyes:      Conjunctiva/sclera: Conjunctivae normal.      Pupils: Pupils are equal, round, and reactive to light.   Neck:      Thyroid: No thyromegaly.   Cardiovascular:      Rate and Rhythm: Normal rate and regular rhythm.      Pulses:           Dorsalis pedis pulses are 3+ on the right side and 3+ on the left side.      Heart sounds: Normal heart sounds. No murmur heard.  Pulmonary:      Effort: Pulmonary effort is normal. No respiratory distress.      Breath sounds: Normal breath sounds. No wheezing.   Abdominal:      Palpations: Abdomen is soft.      Tenderness: There is no abdominal tenderness. There is no guarding.   Musculoskeletal:      Cervical back: Tenderness present. Decreased range of motion.      Thoracic back: Spasms present. Decreased range of motion.      Lumbar back: Spasms present. Decreased range of motion.      Left knee: Decreased range of motion.      Right lower leg: No edema.      Left lower leg: No edema.      Right foot: Normal range of motion. No deformity.      Left foot: Normal range of motion. No deformity.   Feet:      Right foot:      Protective Sensation: 5 sites tested.  3 sites sensed.      Skin integrity: No ulcer.      Toenail Condition: Right toenails are normal.      Left foot:      Protective Sensation: 5 sites tested.  4 sites sensed.      Skin integrity: No ulcer.      Toenail Condition: Left toenails are normal.   Neurological:      Mental Status: She is alert and oriented to person, place, and time.      Cranial Nerves: 
patient tolerated surgery and anesthesia well  Right foot resection of osteomyelitis with partial 3rd toe amputation right foot and Incise and drain ulcer left foot with application of kerecis graft  fibrotic and necrotic tissue at ulcer site left foot with only minimal bleeding left foot  Patient may need revasc left leg  vascular to follow  Podiatry to follow

## 2024-07-15 NOTE — TELEPHONE ENCOUNTER
Please Approve or Refuse.  Send to Pharmacy per Pt's Request: [facility]      Next Visit Date:  10/15/2024   Last Visit Date: 7/15/2024    Hemoglobin A1C (%)   Date Value   07/15/2024 8.8 (A)   04/01/2024 9.6             ( goal A1C is < 7)   BP Readings from Last 3 Encounters:   07/15/24 118/80   04/01/24 138/80          (goal 120/80)  BUN   Date Value Ref Range Status   06/19/2024 18 8 - 23 mg/dL Final     Creatinine   Date Value Ref Range Status   06/19/2024 0.7 0.5 - 0.9 mg/dL Final     Potassium   Date Value Ref Range Status   06/19/2024 4.0 3.7 - 5.3 mmol/L Final

## 2024-07-15 NOTE — PATIENT INSTRUCTIONS
review.    Other Preventive Recommendations:    A preventive eye exam performed by an eye specialist is recommended every 1-2 years to screen for glaucoma; cataracts, macular degeneration, and other eye disorders.  A preventive dental visit is recommended every 6 months.  Try to get at least 150 minutes of exercise per week or 10,000 steps per day on a pedometer .  Order or download the FREE \"Exercise & Physical Activity: Your Everyday Guide\" from [employer]. Call 1-903.374.8660 or search [employer] online.  You need 8036-5413 mg of calcium and 8089-8713 IU of vitamin D per day. It is possible to meet your calcium requirement with diet alone, but a vitamin D supplement is usually necessary to meet this goal.  When exposed to the sun, use a sunscreen that protects against both UVA and UVB radiation with an SPF of 30 or greater. Reapply every 2 to 3 hours or after sweating, drying off with a towel, or swimming.  Always wear a seat belt when traveling in a car. Always wear a helmet when riding a bicycle or motorcycle.

## 2024-07-29 ENCOUNTER — HOSPITAL ENCOUNTER (OUTPATIENT)
Dept: WOMENS IMAGING | Age: 69
Discharge: HOME | End: 2024-07-31
Payer: COMMERCIAL

## 2024-07-29 VITALS — BODY MASS INDEX: 29.25 KG/M2 | WEIGHT: 149 LBS | HEIGHT: 60 IN

## 2024-07-29 DIAGNOSIS — Z12.31 SCREENING MAMMOGRAM FOR HIGH-RISK PATIENT: ICD-10-CM

## 2024-07-29 DIAGNOSIS — Z78.0 POST-MENOPAUSAL: ICD-10-CM

## 2024-07-29 PROCEDURE — 77063 BREAST TOMOSYNTHESIS BI: CPT

## 2024-07-29 PROCEDURE — 77080 DXA BONE DENSITY AXIAL: CPT

## 2024-07-31 DIAGNOSIS — M81.0 AGE-RELATED OSTEOPOROSIS WITHOUT CURRENT PATHOLOGICAL FRACTURE: Primary | ICD-10-CM

## 2024-07-31 RX ORDER — ALENDRONATE SODIUM 70 MG/1
70 TABLET ORAL
Qty: 13 TABLET | Refills: 0 | Status: SHIPPED | OUTPATIENT
Start: 2024-07-31 | End: 2024-07-31 | Stop reason: ALTCHOICE

## 2024-07-31 RX ORDER — IBANDRONATE SODIUM 150 MG/1
150 TABLET, FILM COATED ORAL
Qty: 3 TABLET | Refills: 3 | Status: SHIPPED | OUTPATIENT
Start: 2024-07-31

## 2024-08-25 DIAGNOSIS — K21.9 GASTROESOPHAGEAL REFLUX DISEASE WITHOUT ESOPHAGITIS: ICD-10-CM

## 2024-08-25 NOTE — TELEPHONE ENCOUNTER
Please Approve or Refuse.  Send to Pharmacy per Pt's Request:      Next Visit Date:  10/15/2024   Last Visit Date: 7/15/2024    Hemoglobin A1C (%)   Date Value   07/15/2024 8.8 (A)   04/01/2024 9.6             ( goal A1C is < 7)   BP Readings from Last 3 Encounters:   07/15/24 118/80   04/01/24 138/80          (goal 120/80)  BUN   Date Value Ref Range Status   06/19/2024 18 8 - 23 mg/dL Final     Creatinine   Date Value Ref Range Status   06/19/2024 0.7 0.5 - 0.9 mg/dL Final     Potassium   Date Value Ref Range Status   06/19/2024 4.0 3.7 - 5.3 mmol/L Final

## 2024-08-25 NOTE — TELEPHONE ENCOUNTER
Please Approve or Refuse.  Send to Pharmacy per Pt's Request:      Next Visit Date:  8/25/2024   Last Visit Date: 7/15/2024    Hemoglobin A1C (%)   Date Value   07/15/2024 8.8 (A)   04/01/2024 9.6             ( goal A1C is < 7)   BP Readings from Last 3 Encounters:   07/15/24 118/80   04/01/24 138/80          (goal 120/80)  BUN   Date Value Ref Range Status   06/19/2024 18 8 - 23 mg/dL Final     Creatinine   Date Value Ref Range Status   06/19/2024 0.7 0.5 - 0.9 mg/dL Final     Potassium   Date Value Ref Range Status   06/19/2024 4.0 3.7 - 5.3 mmol/L Final

## 2024-08-26 RX ORDER — FAMOTIDINE 20 MG/1
TABLET, FILM COATED ORAL
Qty: 60 TABLET | Refills: 3 | Status: SHIPPED | OUTPATIENT
Start: 2024-08-26

## 2024-08-26 RX ORDER — FAMOTIDINE 20 MG/1
TABLET, FILM COATED ORAL
Qty: 60 TABLET | Refills: 3 | OUTPATIENT
Start: 2024-08-26

## 2024-09-03 DIAGNOSIS — E11.42 TYPE 2 DIABETES MELLITUS WITH DIABETIC POLYNEUROPATHY, WITHOUT LONG-TERM CURRENT USE OF INSULIN (HCC): ICD-10-CM

## 2024-09-03 RX ORDER — GLIPIZIDE 5 MG/1
5 TABLET ORAL
Qty: 180 TABLET | Refills: 2 | Status: SHIPPED | OUTPATIENT
Start: 2024-09-03

## 2024-09-19 DIAGNOSIS — E11.42 TYPE 2 DIABETES MELLITUS WITH DIABETIC POLYNEUROPATHY, WITHOUT LONG-TERM CURRENT USE OF INSULIN (HCC): ICD-10-CM

## 2024-09-19 RX ORDER — GABAPENTIN 100 MG/1
100 CAPSULE ORAL 3 TIMES DAILY
Qty: 90 CAPSULE | Refills: 0 | Status: SHIPPED | OUTPATIENT
Start: 2024-09-19 | End: 2024-10-19

## 2024-09-24 DIAGNOSIS — E11.42 TYPE 2 DIABETES MELLITUS WITH DIABETIC POLYNEUROPATHY, WITHOUT LONG-TERM CURRENT USE OF INSULIN (HCC): ICD-10-CM

## 2024-09-24 RX ORDER — SITAGLIPTIN 50 MG/1
50 TABLET, FILM COATED ORAL DAILY
Qty: 90 TABLET | Refills: 1 | Status: SHIPPED | OUTPATIENT
Start: 2024-09-24

## 2024-10-06 ENCOUNTER — APPOINTMENT (OUTPATIENT)
Dept: GENERAL RADIOLOGY | Age: 69
End: 2024-10-06
Payer: COMMERCIAL

## 2024-10-06 ENCOUNTER — HOSPITAL ENCOUNTER (EMERGENCY)
Age: 69
Discharge: HOME | End: 2024-10-06
Attending: EMERGENCY MEDICINE
Payer: COMMERCIAL

## 2024-10-06 VITALS
BODY MASS INDEX: 28.47 KG/M2 | SYSTOLIC BLOOD PRESSURE: 130 MMHG | OXYGEN SATURATION: 97 % | HEART RATE: 86 BPM | RESPIRATION RATE: 15 BRPM | TEMPERATURE: 97.9 F | HEIGHT: 60 IN | DIASTOLIC BLOOD PRESSURE: 70 MMHG | WEIGHT: 145 LBS

## 2024-10-06 DIAGNOSIS — M17.11 ARTHRITIS OF RIGHT KNEE: Primary | ICD-10-CM

## 2024-10-06 PROCEDURE — 99284 EMERGENCY DEPT VISIT MOD MDM: CPT

## 2024-10-06 PROCEDURE — 73562 X-RAY EXAM OF KNEE 3: CPT

## 2024-10-06 PROCEDURE — 6360000002 HC RX W HCPCS: Performed by: EMERGENCY MEDICINE

## 2024-10-06 PROCEDURE — 96372 THER/PROPH/DIAG INJ SC/IM: CPT

## 2024-10-06 RX ORDER — KETOROLAC TROMETHAMINE 30 MG/ML
30 INJECTION, SOLUTION INTRAMUSCULAR; INTRAVENOUS ONCE
Status: COMPLETED | OUTPATIENT
Start: 2024-10-06 | End: 2024-10-06

## 2024-10-06 RX ORDER — IBUPROFEN 600 MG/1
600 TABLET, FILM COATED ORAL EVERY 8 HOURS PRN
Qty: 20 TABLET | Refills: 0 | Status: SHIPPED | OUTPATIENT
Start: 2024-10-06

## 2024-10-06 RX ADMIN — KETOROLAC TROMETHAMINE 30 MG: 30 INJECTION, SOLUTION INTRAMUSCULAR at 18:13

## 2024-10-06 NOTE — ED PROVIDER NOTES
(LIPITOR) 20 MG TABLET    Take 1 tablet by mouth nightly    BLOOD GLUCOSE MONITOR KIT AND SUPPLIES    Test one time a day & as needed for symptoms of irregular blood glucose.    BLOOD GLUCOSE MONITOR STRIPS    Testing once a day.  Please dispense according to patients insurance.    BLOOD GLUCOSE MONITORING SUPPL (ONE TOUCH ULTRA 2) W/DEVICE KIT    1 kit by Does not apply route daily    BLOOD GLUCOSE TEST STRIPS (ASCENSIA AUTODISC VI;ONE TOUCH ULTRA TEST VI) STRIP    1 each by In Vitro route daily As needed.    CAPTOPRIL (CAPOTEN) 12.5 MG TABLET    Take 1 tablet by mouth daily    FAMOTIDINE (PEPCID) 20 MG TABLET    TAKE 1 TABLET BY MOUTH TWICE DAILY AS NEEDED FOR GERD    FENOFIBRATE MICRONIZED (LOFIBRA) 67 MG CAPSULE    Take 1 capsule by mouth    FUROSEMIDE (LASIX) 20 MG TABLET    Take 1 tablet by mouth 2 times daily    GABAPENTIN (NEURONTIN) 100 MG CAPSULE    Take 1 capsule by mouth 3 times daily for 30 days.    GLIPIZIDE (GLUCOTROL) 5 MG TABLET    Take 1 tablet by mouth 2 times daily (before meals)    IBANDRONATE (BONIVA) 150 MG TABLET    Take 1 tablet by mouth every 30 days Take one (1) tablet once per month in the morning with a full glass of water, on an empty stomach, and do not take anything else by mouth or lie down for the next 60 minutes.    JANUVIA 50 MG TABLET    TAKE 1 TABLET BY MOUTH DAILY    JARDIANCE 25 MG TABLET    Take 1 tablet by mouth daily    LANCETS MISC    Testing once a day.  Please dispense according to patients insurance.    METFORMIN (GLUCOPHAGE-XR) 500 MG EXTENDED RELEASE TABLET    Take 2 tablets by mouth 2 times daily    MISC. DEVICES (CPAP MACHINE) MISC    by Does not apply route Needs CPAP. Dx. RAMÍREZ, please see sleep study attached    ROPINIROLE (REQUIP) 0.5 MG TABLET    Take 1 tablet by mouth nightly    VENLAFAXINE (EFFEXOR XR) 75 MG EXTENDED RELEASE CAPSULE    Take 1 capsule by mouth every evening    VITAMIN D (VITAMIN D3) 59514 UNIT CAPS    Take 1 capsule by mouth once a week

## 2024-10-06 NOTE — ED TRIAGE NOTES
Mode of arrival (squad #, walk in, police, etc) : walk in        Chief complaint(s): Rt knee pain        Arrival Note (brief scenario, treatment PTA, etc).: Patient reports right knee pain, hx of arthritis. Patient says it has been hurting a few days and then last night the pain was unbearable and she was unable to sleep at all.         C= \"Have you ever felt that you should Cut down on your drinking?\"  No  A= \"Have people Annoyed you by criticizing your drinking?\"  No  G= \"Have you ever felt bad or Guilty about your drinking?\"  No  E= \"Have you ever had a drink as an Eye-opener first thing in the morning to steady your nerves or to help a hangover?\"  No      Deferred []      Reason for deferring: N/A    *If yes to two or more: probable alcohol abuse.*

## 2024-10-13 DIAGNOSIS — E11.42 TYPE 2 DIABETES MELLITUS WITH DIABETIC POLYNEUROPATHY, WITHOUT LONG-TERM CURRENT USE OF INSULIN (HCC): ICD-10-CM

## 2024-10-13 NOTE — TELEPHONE ENCOUNTER
Please Approve or Refuse.  Send to Pharmacy per Pt's Request:      Next Visit Date:  10/15/2024   Last Visit Date: 7/15/2024    Hemoglobin A1C (%)   Date Value   07/15/2024 8.8 (A)   04/01/2024 9.6             ( goal A1C is < 7)   BP Readings from Last 3 Encounters:   10/06/24 130/70   07/15/24 118/80   04/01/24 138/80          (goal 120/80)  BUN   Date Value Ref Range Status   06/19/2024 18 8 - 23 mg/dL Final     Creatinine   Date Value Ref Range Status   06/19/2024 0.7 0.5 - 0.9 mg/dL Final     Potassium   Date Value Ref Range Status   06/19/2024 4.0 3.7 - 5.3 mmol/L Final

## 2024-10-15 ENCOUNTER — OFFICE VISIT (OUTPATIENT)
Dept: FAMILY MEDICINE CLINIC | Age: 69
End: 2024-10-15
Payer: COMMERCIAL

## 2024-10-15 VITALS
SYSTOLIC BLOOD PRESSURE: 112 MMHG | WEIGHT: 153 LBS | OXYGEN SATURATION: 96 % | BODY MASS INDEX: 30.04 KG/M2 | HEIGHT: 60 IN | DIASTOLIC BLOOD PRESSURE: 60 MMHG | HEART RATE: 92 BPM

## 2024-10-15 DIAGNOSIS — E78.2 MIXED HYPERLIPIDEMIA: ICD-10-CM

## 2024-10-15 DIAGNOSIS — R19.5 POSITIVE COLORECTAL CANCER SCREENING USING COLOGUARD TEST: ICD-10-CM

## 2024-10-15 DIAGNOSIS — M81.0 AGE-RELATED OSTEOPOROSIS WITHOUT CURRENT PATHOLOGICAL FRACTURE: ICD-10-CM

## 2024-10-15 DIAGNOSIS — F32.5 MAJOR DEPRESSIVE DISORDER WITH SINGLE EPISODE, IN FULL REMISSION (HCC): ICD-10-CM

## 2024-10-15 DIAGNOSIS — M17.11 PRIMARY OSTEOARTHRITIS OF RIGHT KNEE: ICD-10-CM

## 2024-10-15 DIAGNOSIS — Z23 ENCOUNTER FOR IMMUNIZATION: ICD-10-CM

## 2024-10-15 DIAGNOSIS — I10 PRIMARY HYPERTENSION: ICD-10-CM

## 2024-10-15 DIAGNOSIS — E11.42 TYPE 2 DIABETES MELLITUS WITH DIABETIC POLYNEUROPATHY, WITHOUT LONG-TERM CURRENT USE OF INSULIN (HCC): Primary | ICD-10-CM

## 2024-10-15 DIAGNOSIS — E55.9 VITAMIN D DEFICIENCY: ICD-10-CM

## 2024-10-15 LAB — HBA1C MFR BLD: 9.2 %

## 2024-10-15 PROCEDURE — 3078F DIAST BP <80 MM HG: CPT | Performed by: FAMILY MEDICINE

## 2024-10-15 PROCEDURE — 3074F SYST BP LT 130 MM HG: CPT | Performed by: FAMILY MEDICINE

## 2024-10-15 PROCEDURE — 3046F HEMOGLOBIN A1C LEVEL >9.0%: CPT | Performed by: FAMILY MEDICINE

## 2024-10-15 PROCEDURE — 83036 HEMOGLOBIN GLYCOSYLATED A1C: CPT | Performed by: FAMILY MEDICINE

## 2024-10-15 PROCEDURE — 1123F ACP DISCUSS/DSCN MKR DOCD: CPT | Performed by: FAMILY MEDICINE

## 2024-10-15 PROCEDURE — G2211 COMPLEX E/M VISIT ADD ON: HCPCS | Performed by: FAMILY MEDICINE

## 2024-10-15 PROCEDURE — 99214 OFFICE O/P EST MOD 30 MIN: CPT | Performed by: FAMILY MEDICINE

## 2024-10-15 RX ORDER — GABAPENTIN 100 MG/1
100 CAPSULE ORAL 3 TIMES DAILY
Qty: 90 CAPSULE | Refills: 0 | Status: SHIPPED | OUTPATIENT
Start: 2024-10-15 | End: 2024-11-14

## 2024-10-15 RX ORDER — PEN NEEDLE, DIABETIC 31 GX5/16"
1 NEEDLE, DISPOSABLE MISCELLANEOUS DAILY
Qty: 100 EACH | Refills: 3 | Status: SHIPPED | OUTPATIENT
Start: 2024-10-15

## 2024-10-15 RX ORDER — ALENDRONATE SODIUM 70 MG/1
70 TABLET ORAL
Qty: 13 TABLET | Refills: 0 | Status: SHIPPED | OUTPATIENT
Start: 2024-10-15

## 2024-10-15 RX ORDER — INSULIN GLARGINE 100 [IU]/ML
10 INJECTION, SOLUTION SUBCUTANEOUS NIGHTLY
Qty: 5 ADJUSTABLE DOSE PRE-FILLED PEN SYRINGE | Refills: 3 | Status: SHIPPED | OUTPATIENT
Start: 2024-10-15

## 2024-10-15 ASSESSMENT — ENCOUNTER SYMPTOMS
COLOR CHANGE: 0
VOMITING: 0
DIARRHEA: 0
TROUBLE SWALLOWING: 0
ABDOMINAL DISTENTION: 0
SINUS PRESSURE: 0
ABDOMINAL PAIN: 0
RHINORRHEA: 0
NAUSEA: 0
CHEST TIGHTNESS: 0
COUGH: 0
EYE REDNESS: 0
RECTAL PAIN: 0
BACK PAIN: 0
SORE THROAT: 0
STRIDOR: 0
WHEEZING: 0
BLOOD IN STOOL: 0
SHORTNESS OF BREATH: 0
CONSTIPATION: 0

## 2024-10-15 NOTE — PROGRESS NOTES
Visit Information    Have you changed or started any medications since your last visit including any over-the-counter medicines, vitamins, or herbal medicines? no   Are you having any side effects from any of your medications? -  no  Have you stopped taking any of your medications? Is so, why? -  no    Have you seen any other physician or provider since your last visit? No  Have you had any other diagnostic tests since your last visit? No  Have you been seen in the emergency room and/or had an admission to a hospital since we last saw you? No  Have you had your routine dental cleaning in the past 6 months? no    Have you activated your [employer] account? If not, what are your barriers? Yes     Patient Care Team:  Les Walker MD as PCP - General (Family Medicine)  Les Walker MD as PCP - Empaneled Provider    Medical History Review  Past Medical, Family, and Social History reviewed and does contribute to the patient presenting condition    Health Maintenance   Topic Date Due    Diabetic retinal exam  Never done    DTaP/Tdap/Td vaccine (1 - Tdap) Never done    Colorectal Cancer Screen  Never done    Shingles vaccine (1 of 2) Never done    Respiratory Syncytial Virus (RSV) Pregnant or age 60 yrs+ (1 - 1-dose 60+ series) Never done    COVID-19 Vaccine (4 - 2023-24 season) 09/01/2024    Diabetic Alb to Cr ratio (uACR) test  06/19/2025    Lipids  06/19/2025    GFR test (Diabetes, CKD 3-4, OR last GFR 15-59)  06/19/2025    Diabetic foot exam  07/15/2025    A1C test (Diabetic or Prediabetic)  07/15/2025    Depression Monitoring  07/15/2025    Breast cancer screen  07/29/2025    DEXA (modify frequency per FRAX score)  Completed    Annual Wellness Visit (Medicare Advantage)  Completed    Flu vaccine  Completed    Pneumococcal 65+ years Vaccine  Completed    Hepatitis C screen  Completed    Hepatitis A vaccine  Aged Out    Hepatitis B vaccine  Aged Out    Hib vaccine  Aged Out    Polio vaccine  Aged Out    Meningococcal 
and allergies were reviewed as documented in today's encounter.      Medications, labs, diagnostic studies, consultations andfollow-up as documented in this encounter.    Return in about 3 months (around 1/15/2025) for 15 MIN FREDRICK, chronic conditions, dm ,htn, hld.    Patient wasseen with total face to face time of 35 minutes. More than 50% of this visit was counseling and education.       Future Appointments   Date Time Provider Department Center   11/8/2024  9:15 AM Sergey Jones, DO [facility]   1/15/2025 11:30 AM Les Walker MD [facility]     This note was completed by using the assistance of a speech-recognition program. However, inadvertent computerized transcription errors may be present. Althoughevery effort was made to ensure accuracy, no guarantees can be provided that every mistake has been identified and corrected by editing.  Electronically signed by Les Walker MD on 10/15/2024  11:58 AM

## 2024-10-21 DIAGNOSIS — E78.2 MIXED HYPERLIPIDEMIA: ICD-10-CM

## 2024-10-21 RX ORDER — ATORVASTATIN CALCIUM 20 MG/1
20 TABLET, FILM COATED ORAL NIGHTLY
Qty: 30 TABLET | Refills: 3 | Status: SHIPPED | OUTPATIENT
Start: 2024-10-21

## 2024-10-21 RX ORDER — ATORVASTATIN CALCIUM 20 MG/1
20 TABLET, FILM COATED ORAL NIGHTLY
Qty: 90 TABLET | OUTPATIENT
Start: 2024-10-21

## 2024-10-21 NOTE — TELEPHONE ENCOUNTER
Please Approve or Refuse.  Send to Pharmacy per Pt's Request:      Next Visit Date:  1/15/2025   Last Visit Date: 10/15/2024    Hemoglobin A1C (%)   Date Value   10/15/2024 9.2   07/15/2024 8.8 (A)   04/01/2024 9.6             ( goal A1C is < 7)   BP Readings from Last 3 Encounters:   10/15/24 112/60   10/06/24 130/70   07/15/24 118/80          (goal 120/80)  BUN   Date Value Ref Range Status   06/19/2024 18 8 - 23 mg/dL Final     Creatinine   Date Value Ref Range Status   06/19/2024 0.7 0.5 - 0.9 mg/dL Final     Potassium   Date Value Ref Range Status   06/19/2024 4.0 3.7 - 5.3 mmol/L Final

## 2024-11-07 SDOH — HEALTH STABILITY: PHYSICAL HEALTH: ON AVERAGE, HOW MANY DAYS PER WEEK DO YOU ENGAGE IN MODERATE TO STRENUOUS EXERCISE (LIKE A BRISK WALK)?: 1 DAY

## 2024-11-07 SDOH — HEALTH STABILITY: PHYSICAL HEALTH: ON AVERAGE, HOW MANY MINUTES DO YOU ENGAGE IN EXERCISE AT THIS LEVEL?: 30 MIN

## 2024-11-08 ENCOUNTER — OFFICE VISIT (OUTPATIENT)
Dept: ORTHOPEDIC SURGERY | Age: 69
End: 2024-11-08

## 2024-11-08 VITALS — BODY MASS INDEX: 29.88 KG/M2 | HEIGHT: 60 IN

## 2024-11-08 DIAGNOSIS — M17.0 ARTHRITIS OF BOTH KNEES: ICD-10-CM

## 2024-11-08 RX ORDER — BUPIVACAINE HYDROCHLORIDE 2.5 MG/ML
2 INJECTION, SOLUTION INFILTRATION; PERINEURAL ONCE
Status: COMPLETED | OUTPATIENT
Start: 2024-11-08 | End: 2024-11-08

## 2024-11-08 RX ORDER — METHYLPREDNISOLONE ACETATE 80 MG/ML
80 INJECTION, SUSPENSION INTRA-ARTICULAR; INTRALESIONAL; INTRAMUSCULAR; SOFT TISSUE ONCE
Status: COMPLETED | OUTPATIENT
Start: 2024-11-08 | End: 2024-11-08

## 2024-11-08 RX ADMIN — METHYLPREDNISOLONE ACETATE 80 MG: 80 INJECTION, SUSPENSION INTRA-ARTICULAR; INTRALESIONAL; INTRAMUSCULAR; SOFT TISSUE at 09:33

## 2024-11-08 RX ADMIN — BUPIVACAINE HYDROCHLORIDE 2 ML: 2.5 INJECTION, SOLUTION INFILTRATION; PERINEURAL at 09:33

## 2024-11-08 NOTE — PROGRESS NOTES
11/8/2024 at 10:19 AM    This note is created with the assistance of a speech recognition program.  While intending to generate a document that actually reflects the content of the visit, the document can still have some errors including those of syntax and sound a like substitutions which may escape proof reading.  In such instances, actual meaning can be extrapolated by contextual diversion

## 2024-11-24 DIAGNOSIS — E11.42 TYPE 2 DIABETES MELLITUS WITH DIABETIC POLYNEUROPATHY, WITHOUT LONG-TERM CURRENT USE OF INSULIN (HCC): ICD-10-CM

## 2024-11-25 RX ORDER — GABAPENTIN 100 MG/1
100 CAPSULE ORAL 3 TIMES DAILY
Qty: 90 CAPSULE | Refills: 0 | Status: SHIPPED | OUTPATIENT
Start: 2024-11-25 | End: 2024-12-25

## 2024-12-03 ENCOUNTER — HOSPITAL ENCOUNTER (OUTPATIENT)
Dept: PHYSICAL THERAPY | Age: 69
Setting detail: THERAPIES SERIES
Discharge: HOME | End: 2024-12-03
Attending: STUDENT IN AN ORGANIZED HEALTH CARE EDUCATION/TRAINING PROGRAM
Payer: COMMERCIAL

## 2024-12-03 PROCEDURE — 97110 THERAPEUTIC EXERCISES: CPT

## 2024-12-03 PROCEDURE — 97162 PT EVAL MOD COMPLEX 30 MIN: CPT

## 2024-12-03 ASSESSMENT — KOOS JR
STANDING UPRIGHT: MILD
HOW SEVERE IS YOUR KNEE STIFFNESS AFTER FIRST WAKING IN MORNING: MODERATE
TWISING OR PIVOTING ON KNEE: MODERATE
GOING UP OR DOWN STAIRS: SEVERE
RISING FROM SITTING: MODERATE
KOOS JR TOTAL INTERVAL SCORE: 54.84
STRAIGHTENING KNEE FULLY: MODERATE
BENDING TO THE FLOOR TO PICK UP OBJECT: MILD

## 2024-12-03 NOTE — FLOWSHEET NOTE
[facility] Fall Risk Assessment    Risk Factor Scale  Score   History of Falls [x] Yes  [] No 25  0 25   Secondary Diagnosis [] Yes  [x] No 15  0 0   Ambulatory Aid [] Furniture  [x] Crutches/cane/walker  [] None/bedrest/wheelchair/nurse 30  15  0 15   IV/Heparin Lock [] Yes  [x] No 20  0 0   Gait/Transferring [x] Impaired  [] Weak  [] Normal/bedrest/immobile 20  10  0 20   Mental Status [] Forgets limitations  [x] Oriented to own ability 15  0 0      Total: 60     Based on the Assessment score: check the appropriate box.    []  No intervention needed   Low =   Score of 0-24    []  Use standard prevention interventions Moderate =  Score of 24-44   [] Give patient handout and discuss fall prevention strategies   [] Establish goal of education for patient/family RE: fall prevention strategies    [x]  Use high risk prevention interventions High = Score of 45 and higher   [x] Give patient handout and discuss fall prevention strategies   [x] Establish goal of education for patient/family Re: fall prevention strategies   [x] Discuss lifeline / other resources    Electronically signed by:   Edwar Scales PT DPT  Date: 12/3/2024

## 2024-12-03 NOTE — THERAPY EVALUATION
[facility]   Outpatient Rehabilitation & Therapy  [address]         Phone: [phone]       Fax: [phone]    Physical Therapy Lower Extremity Evaluation    Date:  12/3/2024  Patient: Alma Strickland  : 1955  MRN: [id number]  Physician: Sergey Jones DO      Insurance: [employer] - $40.00 co-pay Based on medical necessity.  No hard max  Medical Diagnosis: Arthritis of both knees (M17.0)     Please Eval/Treat for 1-2 visits per week for 6-8 weeks and develop home exercise program focusing on bilateral knee. Please include strengthening, balance/proprioception, and pain and edema reduction as well as any additional modalities you feel would be appropriate not specifically addressed above. Weight bearing status: Weight bearing as tolerated.      Clinical Diagnosis: (R) knee pain (M25.561) (L) knee pain (M25.562 )with Muscle Weakness (M62.81)  Onset date: 2024 Next 's appt.: TBD  Visit Count:    Cancel/No Show: 0/0    Subjective:   CC: (B) knee pain   HPI: (2024) Patient is 69 year old female who presented with (R) knee pain due to a fall that occurred on 2024. She struck her (R) knee on the ground as she fell. She followed up with his PCP regarding her symptoms. Based on the clinical findings she was referred to orthopedics. She had an office visit with Dr. Jones on 2024. She received a pain injection in the (R) knee/helped significantly. In addition, a referral for outpatient physical therapy was provided to further address the physical impairments and activity limitations.     Past Medical History:   Diagnosis Date    Asthma     Breast cancer (HCC)     Depression     History of breast cancer     RIGHT BREAST    Hyperlipidemia     Murmur, cardiac     Type 2 diabetes mellitus without complication (HCC)       Past Surgical History:   Procedure Laterality Date    APPENDECTOMY      BREAST LUMPECTOMY Right

## 2024-12-09 ENCOUNTER — HOSPITAL ENCOUNTER (OUTPATIENT)
Dept: PHYSICAL THERAPY | Age: 69
Setting detail: THERAPIES SERIES
Discharge: HOME | End: 2024-12-09
Attending: STUDENT IN AN ORGANIZED HEALTH CARE EDUCATION/TRAINING PROGRAM
Payer: COMMERCIAL

## 2024-12-09 PROCEDURE — 97113 AQUATIC THERAPY/EXERCISES: CPT

## 2024-12-09 NOTE — FLOWSHEET NOTE
MD Mendieta at bedside discussing DC and poc going forward with pt.   Pt in no acute distress at this time  Breathing E/U, still denies angina, still denies sob. Denies discomfort or pain      Step-Ups F/L        Step Down F/L        Lunges F/L        Knee/Flex /Ext                Kickboard Ex. Small       Iso Abd. Verticle 10x3\"       Push-pull 10x       Paddling                Balance        Tandem        SLS                DEEP H2O        Cycling        Jacks        X-Country        Hang                Stretches        Achllies        Hamstring 2x20\"       Lunges stretch at Step                        Cool Down 1 Lap @ Rail       Pain Rating 4-5            Specific Instructions for next treatment: Assess response to initial visit and progress as symptoms allow      Assessment: [] Progressing toward goals.    [] No change.     [x] Other: Initial aquatics visit. Educated patient on the benefits of aquatic therapy and encouraged her to work in controlled ranges to avoid increasing pain. Emphasis on improved postural awareness, proper technique and improved knee mobility. Patient notes tightness and \"twinges\" in her right knee mostly with knee flexion activity. All exercise in 30% WB warm water environment. Overall patient tolerated treatment well; frequent cues needed for technique and to keep on task.     [x] Patient would continue to benefit from skilled physical therapy services in order to address the limited motion, improve her gait pattern with an assistive device, improve (R) quad/hamstring strength and reduce the pain and activity limitations that are currently present.                      Goals  MET NOT MET ON-  GOING  Details   STG: To be met in 6 treatments            1. ? Pain: Decrease pain levels to 0-3/10 (R) knee  when completing her desired ADLs. []  []  []      2. ? ROM: Patient will demonstrate improved ROM by 5 degrees with (R) knee extension to assist with function. []  []  []      3. Demonstrate knowledge of fall risk prevention  []  []  []      LTG: To be met in 12 treatments           1. Improve score on functional assessment tool KOOS - JR by 10 points []  []  []      2. Reduce

## 2024-12-12 ENCOUNTER — HOSPITAL ENCOUNTER (OUTPATIENT)
Dept: PHYSICAL THERAPY | Age: 69
Setting detail: THERAPIES SERIES
Discharge: HOME | End: 2024-12-12
Attending: STUDENT IN AN ORGANIZED HEALTH CARE EDUCATION/TRAINING PROGRAM
Payer: COMMERCIAL

## 2024-12-12 PROCEDURE — 97113 AQUATIC THERAPY/EXERCISES: CPT

## 2024-12-12 NOTE — FLOWSHEET NOTE
[facility]   Outpatient Rehabilitation & Therapy  [address]  P: [phone]   F: [phone]    Physical Therapy Daily Treatment Note      Date:  2024  Patient Name:  Alma Strickland    :  1955  MRN: [id number]  Physician: Sergey Jones DO                               Insurance: [employer] - $40.00 co-pay Based on medical necessity.  No hard max  Medical Diagnosis: Arthritis of both knees (M17.0)     Please Eval/Treat for 1-2 visits per week for 6-8 weeks and develop home exercise program focusing on bilateral knee. Please include strengthening, balance/proprioception, and pain and edema reduction as well as any additional modalities you feel would be appropriate not specifically addressed above. Weight bearing status: Weight bearing as tolerated.                 Clinical Diagnosis: (R) knee pain (M25.561) (L) knee pain (M25.562 )with Muscle Weakness (M62.81)  Onset date: 2024         Next 's appt.: 2/3/25  Visit Count: 3/12                                Cancel/No Show: 0/0    Subjective:  Patient arrived and states she was slightly sore after last session more in L knee. States she had a fall yesterday states she was stepping down on the curb and she had her cane to close to her foot causing her to have imbalance and landed on her bottom. States she is sore across her low back and R hip.   Pain:  [x] Yes  [] No Location: R knee > L knee Pain Rating: (0-10 scale) 4-5/10  Pain altered Tx:  [x] No  [] Yes  Action:    Comments:    Objective:    Precautions: asthma, breast cancer, depression, DM , [place]- does not wear hearing aids into water  Exercises:    [facility]   Rehabilitation Services Exercise Log  Aquatic Knee phase 1    Date of Eval:    12/3/24                            Primary PT: Edwar Scales, PT    Things to Focus On (goals): pain control, active motion and strengthening for the (R) knee     [x] Medicare     **Mildly fearful of deep water

## 2024-12-16 ENCOUNTER — HOSPITAL ENCOUNTER (OUTPATIENT)
Dept: PHYSICAL THERAPY | Age: 69
Setting detail: THERAPIES SERIES
Discharge: HOME | End: 2024-12-16
Attending: STUDENT IN AN ORGANIZED HEALTH CARE EDUCATION/TRAINING PROGRAM
Payer: COMMERCIAL

## 2024-12-16 PROCEDURE — 97113 AQUATIC THERAPY/EXERCISES: CPT

## 2024-12-16 NOTE — FLOWSHEET NOTE
[facility]   Outpatient Rehabilitation & Therapy  [address]  P: [phone]   F: [phone]    Physical Therapy Daily Treatment Note      Date:  2024  Patient Name:  Alma Strickland    :  1955  MRN: [id number]  Physician: Sergey Jones DO                               Insurance: [employer] - $40.00 co-pay Based on medical necessity.  No hard max  Medical Diagnosis: Arthritis of both knees (M17.0)     Please Eval/Treat for 1-2 visits per week for 6-8 weeks and develop home exercise program focusing on bilateral knee. Please include strengthening, balance/proprioception, and pain and edema reduction as well as any additional modalities you feel would be appropriate not specifically addressed above. Weight bearing status: Weight bearing as tolerated.                 Clinical Diagnosis: (R) knee pain (M25.561) (L) knee pain (M25.562 )with Muscle Weakness (M62.81)  Onset date: 2024         Next 's appt.: 2/3/25  Visit Count:                                 Cancel/No Show: 0/0    Subjective:  Staes fluctuations in weather/temps and rainy weather really have her knee aching today. Does feel overall she is tolerating aquatic therapy well. Denies falls since last visit. States she forgot to take Ibuprofen prior to therapy this date  Pain:  [x] Yes  [] No Location: R knee > L knee Pain Rating: (0-10 scale) 3-4/10  Pain altered Tx:  [x] No  [] Yes  Action:    Comments:    Objective:    Precautions: asthma, breast cancer, depression, DM , [place]- does not wear hearing aids into water  Exercises:    [facility]   Rehabilitation Services Exercise Log  Aquatic Knee phase 1    Date of Eval:    12/3/24                            Primary PT: Edwar Scales, PT    Things to Focus On (goals): pain control, active motion and strengthening for the (R) knee     [x] Medicare     **Mildly fearful of deep water **    Date 24     Visit # 2/12 3/12 4/12    yes

## 2024-12-18 ENCOUNTER — HOSPITAL ENCOUNTER (OUTPATIENT)
Dept: PHYSICAL THERAPY | Age: 69
Setting detail: THERAPIES SERIES
Discharge: HOME | End: 2024-12-18
Attending: STUDENT IN AN ORGANIZED HEALTH CARE EDUCATION/TRAINING PROGRAM
Payer: COMMERCIAL

## 2024-12-18 PROCEDURE — 97113 AQUATIC THERAPY/EXERCISES: CPT

## 2024-12-18 PROCEDURE — 97110 THERAPEUTIC EXERCISES: CPT

## 2024-12-18 NOTE — FLOWSHEET NOTE
SLR F/L/R 10x 10x 10x 12x    HS Curl + Pain this date R LE       Step-Ups F/L     Add   Step Down F/L        Lunges F/L        Knee/Flex /Ext  10x 10x 12x            Kickboard Ex. Small Small Small Small    Iso Abd. Verticle 10x3\" 10x3\" 10x3\" 10x5\"    Push-pull 10x 10x 10x 10x    Paddling                Balance        Tandem    1x20\" each    SLS                DEEP H2O        Cycling        Jacks        X-Country        Hang                Stretches        Achllies   2x20\" 2x20\"    Hamstring 2x20\" 2x20\" 2x20\" 2x20\"    Lunges stretch at Step                        Cool Down 1 Lap @ Rail 1 Lap @ Rail 2 Laps @ Rail 2 Laps near rail    Pain Rating 4-5 4-5 3-4          Specific Instructions for next treatment: Add step ups for improved LE strength      Assessment: [x] Progressing toward goals. See PT progress note for specifics. Challenged patient encouraging less UE support during warm up and cool down laps this date- as she typically relies on 1 UE support at rail. Also added tandem balance- more staggered stance than tandem but focused on minimal to no UE support. Increased reps this date with standing LE program. Patient much more comfortable in water and progressing with activity tolerance. Emphasis on postural awareness and working on controlled ranges.     [] No change.     [] Other:    [x] Patient would continue to benefit from skilled physical therapy services in order to address the limited motion, improve her gait pattern with an assistive device, improve (R) quad/hamstring strength and reduce the pain and activity limitations that are currently present.                      Goals  MET NOT MET ON-  GOING  Details   STG: To be met in 6 treatments            1. ? Pain: Decrease pain levels to 0-3/10 (R) knee  when completing her desired ADLs. []  []  []      2. ? ROM: Patient will demonstrate improved ROM by 5 degrees with (R) knee extension to assist with function. []  []  []      3. Demonstrate knowledge of

## 2024-12-18 NOTE — PROGRESS NOTES
[facility]   Outpatient Rehabilitation & Therapy  [address]         Phone: [phone]       Fax: [phone]    Physical Therapy Progress Note Update     Date:  2024  Patient: Alma Strickland  : 1955  MRN: [id number]  Physician: Sergey Jones DO      Insurance: [employer] - $40.00 co-pay Based on medical necessity.  No hard max  Medical Diagnosis: Arthritis of both knees (M17.0)     Please Eval/Treat for 1-2 visits per week for 6-8 weeks and develop home exercise program focusing on bilateral knee. Please include strengthening, balance/proprioception, and pain and edema reduction as well as any additional modalities you feel would be appropriate not specifically addressed above. Weight bearing status: Weight bearing as tolerated.      Clinical Diagnosis: (R) knee pain (M25.561) (L) knee pain (M25.562 )with Muscle Weakness (M62.81)  Onset date: 2024 Next Dr's appt.: TBD  Visit Count:    Cancel/No Show: 0/0    Subjective:   Patient stated that the treatment session have helped. Pain levels have improved within the (R) knee. No longer constant. (L) knee pain relatively unchanged. Functional unchanged. However, she stated that she has been able to remain relatively active on a daily basis as advised but has not been using a heating pad but wishes to start due to the success she has been having with the warm water.     ADL/IADL Previous level of function Current level of function Who currently assists the patient with task   Bathing  [x] Independent  [] Assist [x] Independent  [] Assist    Dress/grooming [x] Independent  [] Assist [x] Independent  [] Assist    Transfer/mobility [x] Independent  [] Assist [x] Independent  [] Assist    Feeding [x] Independent  [] Assist [x] Independent  [] Assist    Toileting [x] Independent  [] Assist [x] Independent  [] Assist    Driving [x] Independent  [] Assist [x] Independent  [] Assist    Housekeeping

## 2024-12-23 ENCOUNTER — HOSPITAL ENCOUNTER (OUTPATIENT)
Dept: PHYSICAL THERAPY | Age: 69
Setting detail: THERAPIES SERIES
Discharge: HOME | End: 2024-12-23
Attending: STUDENT IN AN ORGANIZED HEALTH CARE EDUCATION/TRAINING PROGRAM
Payer: COMMERCIAL

## 2024-12-23 PROCEDURE — 97113 AQUATIC THERAPY/EXERCISES: CPT

## 2024-12-23 NOTE — FLOWSHEET NOTE
[facility]   Outpatient Rehabilitation & Therapy  [address]  P: [phone]   F: [phone]    Physical Therapy Daily Treatment Note      Date:  2024  Patient Name:  Alma Strickland    :  1955  MRN: [id number]  Physician: Sergey Jones DO                               Insurance: [employer] - $40.00 co-pay Based on medical necessity.  No hard max  Medical Diagnosis: Arthritis of both knees (M17.0)     Please Eval/Treat for 1-2 visits per week for 6-8 weeks and develop home exercise program focusing on bilateral knee. Please include strengthening, balance/proprioception, and pain and edema reduction as well as any additional modalities you feel would be appropriate not specifically addressed above. Weight bearing status: Weight bearing as tolerated.                 Clinical Diagnosis: (R) knee pain (M25.561) (L) knee pain (M25.562 )with Muscle Weakness (M62.81)  Onset date: 2024         Next 's appt.: 2/3/25  Visit Count:                                 Cancel/No Show: 0/0    Subjective:  Patient reporting late to therapy stating she over slept and then her car wouldn't start.  Patient came in walking briskly from the locker room.  Patient reporting she has not pain today and that her pain has remained relatively low.  Patient reporting she has been using Voltaren cream on her knees.    Pain:  [] Yes  [x] No Location: R knee > L knee Pain Rating: (0-10 scale) denies/10  Pain altered Tx:  [x] No  [] Yes  Action:    Comments:    Objective:    Precautions: asthma, breast cancer, depression, DM , [place]- does not wear hearing aids into water  Exercises:    [facility]   Rehabilitation Services Exercise Log  Aquatic Knee phase 1    Date of Eval:    12/3/24                            Primary PT: Edwar Scales, PT    Things to Focus On (goals): pain control, active motion and strengthening for the (R) knee     [x] Medicare     **Mildly fearful of deep water

## 2024-12-24 DIAGNOSIS — K21.9 GASTROESOPHAGEAL REFLUX DISEASE WITHOUT ESOPHAGITIS: ICD-10-CM

## 2024-12-26 ENCOUNTER — HOSPITAL ENCOUNTER (OUTPATIENT)
Dept: PHYSICAL THERAPY | Age: 69
Setting detail: THERAPIES SERIES
Discharge: HOME | End: 2024-12-26
Attending: STUDENT IN AN ORGANIZED HEALTH CARE EDUCATION/TRAINING PROGRAM
Payer: COMMERCIAL

## 2024-12-26 PROCEDURE — 97113 AQUATIC THERAPY/EXERCISES: CPT

## 2024-12-26 RX ORDER — FAMOTIDINE 20 MG/1
TABLET, FILM COATED ORAL
Qty: 60 TABLET | Refills: 3 | Status: SHIPPED | OUTPATIENT
Start: 2024-12-26

## 2024-12-26 NOTE — FLOWSHEET NOTE
[facility]   Outpatient Rehabilitation & Therapy  [address]  P: [phone]   F: [phone]    Physical Therapy Daily Treatment Note      Date:  2024  Patient Name:  Alma Strickland    :  1955  MRN: [id number]  Physician: Sergey Jones DO                               Insurance: [employer] - $40.00 co-pay Based on medical necessity.  No hard max  Medical Diagnosis: Arthritis of both knees (M17.0)     Please Eval/Treat for 1-2 visits per week for 6-8 weeks and develop home exercise program focusing on bilateral knee. Please include strengthening, balance/proprioception, and pain and edema reduction as well as any additional modalities you feel would be appropriate not specifically addressed above. Weight bearing status: Weight bearing as tolerated.                 Clinical Diagnosis: (R) knee pain (M25.561) (L) knee pain (M25.562 )with Muscle Weakness (M62.81)  Onset date: 2024         Next 's appt.: 2/3/25  Visit Count:                                 Cancel/No Show: 0/0    Subjective:  Patient arrived and states with increased activity due to holidays and cold weather her (B) knees seem to be in more pain. States she does put Voltaren on and applies heat but minimal improvement. States she has most relief with being in pool.   Pain:  [] Yes  [x] No Location: R knee > L knee Pain Rating: (0-10 scale) denies/10  Pain altered Tx:  [x] No  [] Yes  Action:      Objective:    Precautions: asthma, breast cancer, depression, DM , [place]- does not wear hearing aids into water  Exercises:    [facility]   Rehabilitation Services Exercise Log  Aquatic Knee phase 1    Date of Eval:    12/3/24                            Primary PT: Edwar Scales, PT    Things to Focus On (goals): pain control, active motion and strengthening for the (R) knee     [x] Medicare     **Mildly fearful of deep water **    Date 24   Visit #

## 2024-12-30 ENCOUNTER — HOSPITAL ENCOUNTER (OUTPATIENT)
Dept: PHYSICAL THERAPY | Age: 69
Setting detail: THERAPIES SERIES
Discharge: HOME | End: 2024-12-30
Attending: STUDENT IN AN ORGANIZED HEALTH CARE EDUCATION/TRAINING PROGRAM
Payer: COMMERCIAL

## 2024-12-30 PROCEDURE — 97113 AQUATIC THERAPY/EXERCISES: CPT

## 2024-12-30 NOTE — FLOWSHEET NOTE
[facility]   Outpatient Rehabilitation & Therapy  [address]  P: [phone]   F: [phone]    Physical Therapy Daily Treatment Note      Date:  2024  Patient Name:  Alma Strickland    :  1955  MRN: [id number]  Physician: Sergey Jones DO                               Insurance: [employer] - $40.00 co-pay Based on medical necessity.  No hard max  Medical Diagnosis: Arthritis of both knees (M17.0)     Please Eval/Treat for 1-2 visits per week for 6-8 weeks and develop home exercise program focusing on bilateral knee. Please include strengthening, balance/proprioception, and pain and edema reduction as well as any additional modalities you feel would be appropriate not specifically addressed above. Weight bearing status: Weight bearing as tolerated.                 Clinical Diagnosis: (R) knee pain (M25.561) (L) knee pain (M25.562 )with Muscle Weakness (M62.81)  Onset date: 2024         Next Dr's appt.: 2/3/25  Visit Count:                                 Cancel/No Show: 0/0    Subjective: Arrives with increased aching in R knee but reports once she gets moving and loosens it, the pain lessens. Has noticed improved ability in and out of water to go up and down steps. Patient does note improved mobility with R knee    Pain:  [x] Yes  [] No Location: R knee > L knee Pain Rating: (0-10 scale) 3/10  Pain altered Tx:  [x] No  [] Yes  Action:      Objective:    Precautions: asthma, breast cancer, depression, DM , [place]- does not wear hearing aids into water  Exercises:    [facility]   Rehabilitation Services Exercise Log  Aquatic Knee phase 1    Date of Eval:    12/3/24                            Primary PT: Edwar Scales, PT    Things to Focus On (goals): pain control, active motion and strengthening for the (R) knee     [x] Medicare     **Mildly fearful of deep water **    Date 24    Visit #         Try no

## 2025-01-02 ENCOUNTER — HOSPITAL ENCOUNTER (OUTPATIENT)
Dept: PHYSICAL THERAPY | Age: 70
Setting detail: THERAPIES SERIES
Discharge: HOME | End: 2025-01-02
Attending: STUDENT IN AN ORGANIZED HEALTH CARE EDUCATION/TRAINING PROGRAM
Payer: COMMERCIAL

## 2025-01-02 PROCEDURE — 97113 AQUATIC THERAPY/EXERCISES: CPT

## 2025-01-02 NOTE — FLOWSHEET NOTE
ROM by 5 degrees with (R) knee extension to assist with function. [x]  []  []      3. Demonstrate knowledge of fall risk prevention  []  [x]  []      LTG: To be met in 12 treatments           1. Improve score on functional assessment tool KOOS - JR by 10 points []  []  []      2. Reduce pain levels to 0-1/10 (B) knees or less when completing her desired ADLs. []  []  []      3. ? Strength: Increase strength in all involved  motions to 4-5/5 to return to function without limitations []  []  []      4. ? ROM: Patient will demonstrate full PROM within the (R) knee to assist with function. []  []  []      5. Patient will demonstrate an improved gait pattern with an assistive device.  []  []  []      6. Patient will demonstrate independence with her home exercise program at discharge.  []  []  []           Patient goals: To calm the arthritis pain and make it livable     Pt. Education:  [] Yes  [] No  [x] Reviewed Prior HEP/Ed- emphasis on balance and stability to build knee strength; improved confidence with strength; improved postural awareness and core activation while working in controlled ranges    Method of Education: [] Verbal  [x] Demo  [] Written  Comprehension of Education:  [x] Verbalizes understanding.  [x] Demonstrates understanding.  [] Needs review.  [] Demonstrates/verbalizes HEP/Ed previously given.     Plan: [x] Continue per plan of care.   [] Other:       Treatment Charges: Mins Units   []  Modalities     []  Ther Exercise     []  Manual Therapy     []  Ther Activities     [x]  Aquatics 43 3   []  Neuromuscular     [] Vasocompression     [] Gait Training     [] Dry needling        [] 1 or 2 muscles        [] 3 or more muscles     []  Other  Re-Evaluation      Total Billable time 43 3     Aquatic Time In: 100 PM         Time Out: 143 PM    Electronically signed by:  Анна Botello PTA

## 2025-01-06 ENCOUNTER — HOSPITAL ENCOUNTER (OUTPATIENT)
Dept: PHYSICAL THERAPY | Age: 70
Setting detail: THERAPIES SERIES
Discharge: HOME | End: 2025-01-06
Attending: STUDENT IN AN ORGANIZED HEALTH CARE EDUCATION/TRAINING PROGRAM
Payer: COMMERCIAL

## 2025-01-06 PROCEDURE — 97110 THERAPEUTIC EXERCISES: CPT

## 2025-01-06 PROCEDURE — 97113 AQUATIC THERAPY/EXERCISES: CPT

## 2025-01-06 ASSESSMENT — KOOS JR
KOOS JR TOTAL INTERVAL SCORE: 44.905
STRAIGHTENING KNEE FULLY: MODERATE
BENDING TO THE FLOOR TO PICK UP OBJECT: SEVERE
STANDING UPRIGHT: MODERATE
TWISING OR PIVOTING ON KNEE: SEVERE
HOW SEVERE IS YOUR KNEE STIFFNESS AFTER FIRST WAKING IN MORNING: MODERATE
GOING UP OR DOWN STAIRS: MODERATE
RISING FROM SITTING: SEVERE

## 2025-01-06 NOTE — THERAPY DISCHARGE
[facility]   Outpatient Rehabilitation & Therapy  [address]         Phone: [phone]       Fax: [phone]    Physical Therapy Progress Note Update/Discharge Summary     Date:  2025  Patient: Alma Strickland  : 1955  MRN: [id number]  Physician: Sergey Jones DO      Insurance: [employer] - $40.00 co-pay Based on medical necessity.  No hard max  Medical Diagnosis: Arthritis of both knees (M17.0)     Please Eval/Treat for 1-2 visits per week for 6-8 weeks and develop home exercise program focusing on bilateral knee. Please include strengthening, balance/proprioception, and pain and edema reduction as well as any additional modalities you feel would be appropriate not specifically addressed above. Weight bearing status: Weight bearing as tolerated.      Clinical Diagnosis: (R) knee pain (M25.561) (L) knee pain (M25.562 )with Muscle Weakness (M62.81)  Onset date: 2024 Next Dr's appt.: TBD  Visit Count: 10/12   Cancel/No Show: 0/0    Subjective:   Patient stated that the treatment sessions have continued to help. However, the colder temperatures have caused her pain levels to remain unchanged over the last several days along with her function.     ADL/IADL Previous level of function Current level of function Who currently assists the patient with task   Bathing  [x] Independent  [] Assist [x] Independent  [] Assist    Dress/grooming [x] Independent  [] Assist [x] Independent  [] Assist    Transfer/mobility [x] Independent  [] Assist [x] Independent  [] Assist    Feeding [x] Independent  [] Assist [x] Independent  [] Assist    Toileting [x] Independent  [] Assist [x] Independent  [] Assist    Driving [x] Independent  [] Assist [x] Independent  [] Assist    Housekeeping [x] Independent  [] Assist [x] Independent  [] Assist    Grocery shop/meal prep [x] Independent  [] Assist [x] Independent  [] Assist      Gait Prior level of function Current

## 2025-01-06 NOTE — FLOWSHEET NOTE
NOT MET ON-  GOING  Details   STG: To be met in 6 treatments            1. ? Pain: Decrease pain levels to 0-3/10 (R) knee  when completing her desired ADLs. []  [x]  []      2. ? ROM: Patient will demonstrate improved ROM by 5 degrees with (R) knee extension to assist with function. [x]  []  []      3. Demonstrate knowledge of fall risk prevention  []  [x]  []      LTG: To be met in 12 treatments           1. Improve score on functional assessment tool KOOS - JR by 10 points []  []  []      2. Reduce pain levels to 0-1/10 (B) knees or less when completing her desired ADLs. []  []  []      3. ? Strength: Increase strength in all involved  motions to 4-5/5 to return to function without limitations []  []  []      4. ? ROM: Patient will demonstrate full PROM within the (R) knee to assist with function. []  []  []      5. Patient will demonstrate an improved gait pattern with an assistive device.  []  []  []      6. Patient will demonstrate independence with her home exercise program at discharge.  []  []  []           Patient goals: To calm the arthritis pain and make it livable     Pt. Education:  [] Yes  [] No  [x] Reviewed Prior HEP/Ed- emphasis on balance and stability to build knee strength; improved confidence with strength; improved postural awareness and core activation while working in controlled ranges    Method of Education: [] Verbal  [x] Demo  [] Written  Comprehension of Education:  [x] Verbalizes understanding.  [x] Demonstrates understanding.  [] Needs review.  [] Demonstrates/verbalizes HEP/Ed previously given.     Plan: [x] Continue per plan of care.   [] Other:   Physical therapy treatment completed today in part by physical therapist assistant (PTA).    Treatment Charges: Mins Units   []  Modalities     [x]  Ther Exercise 25 2   []  Manual Therapy     []  Ther Activities     [x]  Aquatics 38 3   []  Neuromuscular     [] Vasocompression     [] Gait Training     [] Dry needling        [] 1 or 2

## 2025-01-08 ENCOUNTER — HOSPITAL ENCOUNTER (OUTPATIENT)
Dept: PHYSICAL THERAPY | Age: 70
Setting detail: THERAPIES SERIES
Discharge: HOME | End: 2025-01-08
Attending: STUDENT IN AN ORGANIZED HEALTH CARE EDUCATION/TRAINING PROGRAM
Payer: COMMERCIAL

## 2025-01-08 PROCEDURE — 97113 AQUATIC THERAPY/EXERCISES: CPT

## 2025-01-08 NOTE — FLOWSHEET NOTE
[facility]   Outpatient Rehabilitation & Therapy  [address]  P: [phone]   F: [phone]    Physical Therapy Daily Treatment Note    Date:  2025  Patient Name:  Alma Strickland    :  1955  MRN: [id number]  Physician: Sergey Jones DO                               Insurance: [employer] - $40.00 co-pay Based on medical necessity.  No hard max  Medical Diagnosis: Arthritis of both knees (M17.0)     Please Eval/Treat for 1-2 visits per week for 6-8 weeks and develop home exercise program focusing on bilateral knee. Please include strengthening, balance/proprioception, and pain and edema reduction as well as any additional modalities you feel would be appropriate not specifically addressed above. Weight bearing status: Weight bearing as tolerated.                 Clinical Diagnosis: (R) knee pain (M25.561) (L) knee pain (M25.562 )with Muscle Weakness (M62.81)  Onset date: 2024         Next 's appt.: 2/3/25  Visit Count:                                 Cancel/No Show: 0/0    Subjective:   Patient arrived and states (B) knee pain but R continues to be worse than L. States     Pain:  [x] Yes  [] No Location: R knee > L knee Pain Rating: (0-10 scale) 5-6/10  Pain altered Tx:  [x] No  [] Yes  Action:    Objective:    Precautions: asthma, breast cancer, depression, DM , [place]- does not wear hearing aids into water  Exercises:    [facility]   Rehabilitation Services Exercise Log  Aquatic Knee phase 1    Date of Eval:    12/3/24                            Primary PT: Edwar Scales, PT    Things to Focus On (goals): pain control, active motion and strengthening for the (R) knee     [x] Medicare     **Mildly fearful of deep water **    Date 24   Visit # 7/12 8/12 9/12 10/12 11/12     Try no UE support      Walk F/L/R 2 Laps near rail 2 Laps 2 Laps 2 Laps 2 Laps   Marching 2 Laps 2 Laps 2 Laps 2 Laps 2 Laps   Squats 12x3\" Low

## 2025-01-13 ENCOUNTER — HOSPITAL ENCOUNTER (OUTPATIENT)
Dept: PHYSICAL THERAPY | Age: 70
Setting detail: THERAPIES SERIES
Discharge: HOME | End: 2025-01-13
Attending: STUDENT IN AN ORGANIZED HEALTH CARE EDUCATION/TRAINING PROGRAM
Payer: COMMERCIAL

## 2025-01-13 PROCEDURE — 97113 AQUATIC THERAPY/EXERCISES: CPT
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
## 2025-01-15 ENCOUNTER — OFFICE VISIT (OUTPATIENT)
Dept: FAMILY MEDICINE CLINIC | Age: 70
End: 2025-01-15
Payer: COMMERCIAL

## 2025-01-15 VITALS
SYSTOLIC BLOOD PRESSURE: 110 MMHG | HEART RATE: 105 BPM | WEIGHT: 157 LBS | HEIGHT: 60 IN | BODY MASS INDEX: 30.82 KG/M2 | OXYGEN SATURATION: 98 % | DIASTOLIC BLOOD PRESSURE: 66 MMHG

## 2025-01-15 DIAGNOSIS — F03.A18 MILD DEMENTIA WITH OTHER BEHAVIORAL DISTURBANCE, UNSPECIFIED DEMENTIA TYPE (HCC): ICD-10-CM

## 2025-01-15 DIAGNOSIS — M81.0 AGE-RELATED OSTEOPOROSIS WITHOUT CURRENT PATHOLOGICAL FRACTURE: ICD-10-CM

## 2025-01-15 DIAGNOSIS — E78.2 MIXED HYPERLIPIDEMIA: ICD-10-CM

## 2025-01-15 DIAGNOSIS — I10 PRIMARY HYPERTENSION: ICD-10-CM

## 2025-01-15 DIAGNOSIS — F32.5 MAJOR DEPRESSIVE DISORDER WITH SINGLE EPISODE, IN FULL REMISSION (HCC): ICD-10-CM

## 2025-01-15 DIAGNOSIS — E11.42 TYPE 2 DIABETES MELLITUS WITH DIABETIC POLYNEUROPATHY, WITHOUT LONG-TERM CURRENT USE OF INSULIN (HCC): Primary | ICD-10-CM

## 2025-01-15 DIAGNOSIS — Z12.11 COLON CANCER SCREENING: ICD-10-CM

## 2025-01-15 PROBLEM — F03.A0 MILD DEMENTIA (HCC): Status: ACTIVE | Noted: 2025-01-15

## 2025-01-15 LAB — HBA1C MFR BLD: 9.5 %

## 2025-01-15 PROCEDURE — 1126F AMNT PAIN NOTED NONE PRSNT: CPT | Performed by: FAMILY MEDICINE

## 2025-01-15 PROCEDURE — 3078F DIAST BP <80 MM HG: CPT | Performed by: FAMILY MEDICINE

## 2025-01-15 PROCEDURE — 99215 OFFICE O/P EST HI 40 MIN: CPT | Performed by: FAMILY MEDICINE

## 2025-01-15 PROCEDURE — 83036 HEMOGLOBIN GLYCOSYLATED A1C: CPT | Performed by: FAMILY MEDICINE

## 2025-01-15 PROCEDURE — 1123F ACP DISCUSS/DSCN MKR DOCD: CPT | Performed by: FAMILY MEDICINE

## 2025-01-15 PROCEDURE — 3074F SYST BP LT 130 MM HG: CPT | Performed by: FAMILY MEDICINE

## 2025-01-15 PROCEDURE — 3046F HEMOGLOBIN A1C LEVEL >9.0%: CPT | Performed by: FAMILY MEDICINE

## 2025-01-15 PROCEDURE — 1160F RVW MEDS BY RX/DR IN RCRD: CPT | Performed by: FAMILY MEDICINE

## 2025-01-15 PROCEDURE — 1159F MED LIST DOCD IN RCRD: CPT | Performed by: FAMILY MEDICINE

## 2025-01-15 RX ORDER — FENOFIBRATE 67 MG/1
67 CAPSULE ORAL
Qty: 90 CAPSULE | Refills: 1 | Status: SHIPPED | OUTPATIENT
Start: 2025-01-15

## 2025-01-15 RX ORDER — ALENDRONATE SODIUM 70 MG/1
70 TABLET ORAL
Qty: 13 TABLET | Refills: 0 | Status: SHIPPED | OUTPATIENT
Start: 2025-01-15

## 2025-01-15 RX ORDER — VENLAFAXINE HYDROCHLORIDE 75 MG/1
75 CAPSULE, EXTENDED RELEASE ORAL EVERY EVENING
Qty: 90 CAPSULE | Refills: 1 | Status: SHIPPED | OUTPATIENT
Start: 2025-01-15

## 2025-01-15 RX ORDER — ACYCLOVIR 800 MG/1
TABLET ORAL
Qty: 1 EACH | Refills: 1 | Status: SHIPPED | OUTPATIENT
Start: 2025-01-15

## 2025-01-15 RX ORDER — INSULIN GLARGINE 300 U/ML
15 INJECTION, SOLUTION SUBCUTANEOUS NIGHTLY
Qty: 10 ADJUSTABLE DOSE PRE-FILLED PEN SYRINGE | Refills: 1 | Status: SHIPPED | OUTPATIENT
Start: 2025-01-15

## 2025-01-15 RX ORDER — KETOROLAC TROMETHAMINE 30 MG/ML
INJECTION, SOLUTION INTRAMUSCULAR; INTRAVENOUS
Qty: 1 EACH | Refills: 0 | Status: SHIPPED | OUTPATIENT
Start: 2025-01-15

## 2025-01-15 SDOH — ECONOMIC STABILITY: FOOD INSECURITY: WITHIN THE PAST 12 MONTHS, THE FOOD YOU BOUGHT JUST DIDN'T LAST AND YOU DIDN'T HAVE MONEY TO GET MORE.: PATIENT DECLINED

## 2025-01-15 SDOH — ECONOMIC STABILITY: FOOD INSECURITY: WITHIN THE PAST 12 MONTHS, YOU WORRIED THAT YOUR FOOD WOULD RUN OUT BEFORE YOU GOT MONEY TO BUY MORE.: PATIENT DECLINED

## 2025-01-15 ASSESSMENT — ENCOUNTER SYMPTOMS
COLOR CHANGE: 0
RECTAL PAIN: 0
BACK PAIN: 0
NAUSEA: 0
COUGH: 0
SINUS PRESSURE: 0
EYE REDNESS: 0
RHINORRHEA: 0
CONSTIPATION: 0
VOMITING: 0
BLOOD IN STOOL: 0
CHEST TIGHTNESS: 0
SORE THROAT: 0
WHEEZING: 0
DIARRHEA: 0
ABDOMINAL PAIN: 0
SHORTNESS OF BREATH: 0
TROUBLE SWALLOWING: 0
STRIDOR: 0
ABDOMINAL DISTENTION: 0

## 2025-01-15 ASSESSMENT — ANXIETY QUESTIONNAIRES
3. WORRYING TOO MUCH ABOUT DIFFERENT THINGS: MORE THAN HALF THE DAYS
GAD7 TOTAL SCORE: 12
7. FEELING AFRAID AS IF SOMETHING AWFUL MIGHT HAPPEN: MORE THAN HALF THE DAYS
4. TROUBLE RELAXING: SEVERAL DAYS
1. FEELING NERVOUS, ANXIOUS, OR ON EDGE: MORE THAN HALF THE DAYS
2. NOT BEING ABLE TO STOP OR CONTROL WORRYING: MORE THAN HALF THE DAYS
IF YOU CHECKED OFF ANY PROBLEMS ON THIS QUESTIONNAIRE, HOW DIFFICULT HAVE THESE PROBLEMS MADE IT FOR YOU TO DO YOUR WORK, TAKE CARE OF THINGS AT HOME, OR GET ALONG WITH OTHER PEOPLE: VERY DIFFICULT
5. BEING SO RESTLESS THAT IT IS HARD TO SIT STILL: SEVERAL DAYS
6. BECOMING EASILY ANNOYED OR IRRITABLE: MORE THAN HALF THE DAYS

## 2025-01-15 ASSESSMENT — PATIENT HEALTH QUESTIONNAIRE - PHQ9
SUM OF ALL RESPONSES TO PHQ QUESTIONS 1-9: 9
SUM OF ALL RESPONSES TO PHQ QUESTIONS 1-9: 9
9. THOUGHTS THAT YOU WOULD BE BETTER OFF DEAD, OR OF HURTING YOURSELF: NOT AT ALL
7. TROUBLE CONCENTRATING ON THINGS, SUCH AS READING THE NEWSPAPER OR WATCHING TELEVISION: SEVERAL DAYS
4. FEELING TIRED OR HAVING LITTLE ENERGY: SEVERAL DAYS
10. IF YOU CHECKED OFF ANY PROBLEMS, HOW DIFFICULT HAVE THESE PROBLEMS MADE IT FOR YOU TO DO YOUR WORK, TAKE CARE OF THINGS AT HOME, OR GET ALONG WITH OTHER PEOPLE: EXTREMELY DIFFICULT
5. POOR APPETITE OR OVEREATING: SEVERAL DAYS
8. MOVING OR SPEAKING SO SLOWLY THAT OTHER PEOPLE COULD HAVE NOTICED. OR THE OPPOSITE, BEING SO FIGETY OR RESTLESS THAT YOU HAVE BEEN MOVING AROUND A LOT MORE THAN USUAL: NOT AT ALL
6. FEELING BAD ABOUT YOURSELF - OR THAT YOU ARE A FAILURE OR HAVE LET YOURSELF OR YOUR FAMILY DOWN: SEVERAL DAYS
SUM OF ALL RESPONSES TO PHQ QUESTIONS 1-9: 9
SUM OF ALL RESPONSES TO PHQ QUESTIONS 1-9: 9
1. LITTLE INTEREST OR PLEASURE IN DOING THINGS: MORE THAN HALF THE DAYS
SUM OF ALL RESPONSES TO PHQ9 QUESTIONS 1 & 2: 4
2. FEELING DOWN, DEPRESSED OR HOPELESS: MORE THAN HALF THE DAYS
3. TROUBLE FALLING OR STAYING ASLEEP: SEVERAL DAYS
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
## 2025-01-22 DIAGNOSIS — F32.5 MAJOR DEPRESSIVE DISORDER WITH SINGLE EPISODE, IN FULL REMISSION (HCC): ICD-10-CM

## 2025-01-22 DIAGNOSIS — I10 PRIMARY HYPERTENSION: ICD-10-CM

## 2025-01-22 RX ORDER — CAPTOPRIL 12.5 MG/1
12.5 TABLET ORAL DAILY
Qty: 90 TABLET | Refills: 1 | Status: SHIPPED | OUTPATIENT
Start: 2025-01-22

## 2025-01-22 RX ORDER — VENLAFAXINE HYDROCHLORIDE 75 MG/1
75 CAPSULE, EXTENDED RELEASE ORAL EVERY EVENING
Qty: 90 CAPSULE | Refills: 1 | Status: SHIPPED | OUTPATIENT
Start: 2025-01-22

## 2025-02-03 ENCOUNTER — OFFICE VISIT (OUTPATIENT)
Dept: ORTHOPEDIC SURGERY | Age: 70
End: 2025-02-03
Payer: COMMERCIAL

## 2025-02-03 VITALS — HEIGHT: 60 IN | BODY MASS INDEX: 30.82 KG/M2 | WEIGHT: 157 LBS

## 2025-02-03 DIAGNOSIS — M17.0 ARTHRITIS OF BOTH KNEES: Primary | ICD-10-CM

## 2025-02-03 PROCEDURE — 99213 OFFICE O/P EST LOW 20 MIN: CPT | Performed by: STUDENT IN AN ORGANIZED HEALTH CARE EDUCATION/TRAINING PROGRAM

## 2025-02-03 PROCEDURE — 1125F AMNT PAIN NOTED PAIN PRSNT: CPT | Performed by: STUDENT IN AN ORGANIZED HEALTH CARE EDUCATION/TRAINING PROGRAM

## 2025-02-03 PROCEDURE — 1159F MED LIST DOCD IN RCRD: CPT | Performed by: STUDENT IN AN ORGANIZED HEALTH CARE EDUCATION/TRAINING PROGRAM

## 2025-02-03 PROCEDURE — 1123F ACP DISCUSS/DSCN MKR DOCD: CPT | Performed by: STUDENT IN AN ORGANIZED HEALTH CARE EDUCATION/TRAINING PROGRAM

## 2025-02-03 NOTE — PROGRESS NOTES
well perfused  Psych:   Patient has good fund of knowledge and displays understanding of exam, diagnosis, and plan.  Right Lower Extremity: Skin intact.  3-120 degrees ROM.  Stable to varus valgus stress.  Compartments soft/compressible.  No joint line tenderness.  Extremity warm/well perfused. EHL/FHL/TA/GSC motor intact. Patient expresses normal sensation L3-S1 distribution     Radiology:  No new radiographs taken at this visit.     Assessment:   69 y.o. year old female with right knee tricompartmental osteoarthritis  Plan:   Lengthy discussion had with patient about current clinical state.  Overall patient is doing very well since her visit in November.  She states that the corticosteroid injection has not helped her greatly as well as her water therapy.  It is too soon to receive another injection has not been for months.  At this time feel it is appropriate to have the patient call us when her knee pain becomes severe enough to require additional injection.  We gave her a another prescription to water therapy as she said this helps her knee pain greatly.    At this time patient is not wanting to pursue surgical management as she is overall doing well.  We have the patient follow-up in 1 month or longer for a repeat corticosteroid injection.  Whenever fusion she feels like she needs it.    Sean Arias DO  Orthopedic Surgery, PGY-1  [place], [place]    Electronically signed by Sean Arias DO on 2/3/2025 at 1:17 PM    This note is created with the assistance of a speech recognition program.  While intending to generate a document that actually reflects the content of the visit, the document can still have some errors including those of syntax and sound a like substitutions which may escape proof reading.  In such instances, actual meaning can be extrapolated by contextual diversion

## 2025-02-05 ENCOUNTER — OFFICE VISIT (OUTPATIENT)
Dept: NEUROLOGY | Age: 70
End: 2025-02-05
Payer: COMMERCIAL

## 2025-02-05 VITALS
HEART RATE: 100 BPM | HEIGHT: 60 IN | SYSTOLIC BLOOD PRESSURE: 110 MMHG | DIASTOLIC BLOOD PRESSURE: 61 MMHG | BODY MASS INDEX: 31.25 KG/M2 | WEIGHT: 159.2 LBS

## 2025-02-05 DIAGNOSIS — R41.3 MEMORY CHANGES: Primary | ICD-10-CM

## 2025-02-05 PROCEDURE — 99483 ASSMT & CARE PLN PT COG IMP: CPT | Performed by: STUDENT IN AN ORGANIZED HEALTH CARE EDUCATION/TRAINING PROGRAM

## 2025-02-05 PROCEDURE — 3074F SYST BP LT 130 MM HG: CPT | Performed by: STUDENT IN AN ORGANIZED HEALTH CARE EDUCATION/TRAINING PROGRAM

## 2025-02-05 PROCEDURE — 3078F DIAST BP <80 MM HG: CPT | Performed by: STUDENT IN AN ORGANIZED HEALTH CARE EDUCATION/TRAINING PROGRAM

## 2025-02-05 NOTE — PROGRESS NOTES
5/5 strength, no drift, normal bulk and tone. Left Lower Extremity:5/5 strength, no drift, normal bulk and tone      Reflexes:        R          L  B          2          2  T          2          2  BR       2          2  P          2          2      Coordination: normal finger-to-nose and heel-to-shin, normal rapid alternating movements, normal circular movements.     Sensation: Intact to light touch in 4 extremities.     Station and Gait:gait normal, and station normal.      IMPRESSION: Alma Strickland is a 69 y.o. right handed female presenting with progressive cognitive decline, short-term memory deficits, personality changes, and impaired judgment.  Given the personality changes (hoarding behavior, compulsive spending, unusual vocalizations, and a shift from previous organized habits), frontotemporal dementia (bvFTD) is highly suspected. Differential diagnoses include Alzheimer’s disease and vascular dementia, but predominant behavioral symptoms suggest FTD.  On MoCA today she scored 22/30 (visuospatial/executive 4/5, naming 3/3, attention 6/6, language 2/3, abstraction 2/2, delayed recall 0/5, orientation 6/6).  Rest of the neuroexam was nonfocal    PLAN:  After detailed discussion with Alma Strickland we have made following plan.                 Counseled patient and family regarding the diagnosis of dementia and progression.  Provided packet of materials assembled for patients and families with more information about dementia and resources to assist with coping.    Neuropsychiatric testing would be really helpful in further evaluation.  MRI brain without contrast     Discussed ways to improve brain health with crossword puzzles word games, daily exercise and improving diet.   Emphasized  good control of cholesterol and blood pressure essential in improving vascular risk factors for vascular dementia.            I spent 60 minutes in this visit , with more than 50% of the time devoted to patient counseling

## 2025-02-06 ENCOUNTER — TELEPHONE (OUTPATIENT)
Dept: NEUROLOGY | Age: 70
End: 2025-02-06

## 2025-02-06 NOTE — TELEPHONE ENCOUNTER
No PA required for testing. Left message for patient to call to schedule testing if she is interested.

## 2025-02-13 NOTE — TELEPHONE ENCOUNTER
Spoke with patient she is scheduled for 9/15/2025. Discussed neuropsych testing protocol, address provided, all questions answered. Patient added to cancellation list.

## 2025-02-24 ENCOUNTER — HOSPITAL ENCOUNTER (OUTPATIENT)
Dept: MRI IMAGING | Age: 70
Discharge: HOME | End: 2025-02-26
Attending: STUDENT IN AN ORGANIZED HEALTH CARE EDUCATION/TRAINING PROGRAM
Payer: COMMERCIAL

## 2025-02-24 ENCOUNTER — HOSPITAL ENCOUNTER (OUTPATIENT)
Dept: PHYSICAL THERAPY | Age: 70
Setting detail: THERAPIES SERIES
Discharge: HOME | End: 2025-02-24
Attending: STUDENT IN AN ORGANIZED HEALTH CARE EDUCATION/TRAINING PROGRAM
Payer: COMMERCIAL

## 2025-02-24 DIAGNOSIS — R41.3 MEMORY CHANGES: ICD-10-CM

## 2025-02-24 PROCEDURE — 97161 PT EVAL LOW COMPLEX 20 MIN: CPT

## 2025-02-24 PROCEDURE — 70551 MRI BRAIN STEM W/O DYE: CPT

## 2025-02-24 NOTE — THERAPY EVALUATION
[x] [facility]   Outpatient Rehabilitation & Therapy  [address]  P: [phone]   F: [phone]     Physical therapy LE evaluation     Date:  2025  Patient: lAma Strickland  : 1955  MRN: [id number]  Physician:  Sergey Jones DO   Insurance: [facility]   Medical Diagnosis: M17.0 (ICD-10-CM) - Arthritis of both knees   Rehab Codes: R25 pain , M25.60 stiffness, R53.1 weakness   Onset Date: 2/3/25 (date of referral)    Next 's appt: 25- PCP       PRECAUTIONS: memory issues -- getting work up for dementia     Subjective:   Pt arrives with SPC. Pt notes she is returning to PT with continued R knee pain. She finds when she goes to stand there can be stabbing pain -- happens most of the time. She feels it is better then when she was here for PT before. She reports occasional knee buckling. Denies  any past R knee surgeries. She did have an injection in the R knee in 2023 - seemed to help but wore off and has another one in the next month. She notes she uses SPC to help balance due to neuropathy and pain. She has walkers at home if needed.     She notes there was a near fall stepping over her dog on the steps. Denies any other falls since last PT POC.            PMHx: [] Unremarkable [x] Diabetes [] HTN  [] Pacemaker   [] MI/Heart Problems [x] Cancer [] Arthritis [] Other:                Past Medical History:   Diagnosis Date    Asthma     Breast cancer (HCC)     Depression     History of breast cancer     RIGHT BREAST    Hyperlipidemia     Murmur, cardiac     Type 2 diabetes mellitus without complication (HCC)       PSH:   Past Surgical History:   Procedure Laterality Date    APPENDECTOMY      BREAST LUMPECTOMY Right      SECTION      X3    ELBOW SURGERY Left     NECK SURGERY  2009    DISC        Comorbidities:   [x] Obesity [] Dialysis  [] Other:   [] Asthma/COPD [] Dementia [] Other:   [] Stroke [] Sleep apnea [] Other:   [] Vascular disease []

## 2025-02-25 DIAGNOSIS — E11.42 TYPE 2 DIABETES MELLITUS WITH DIABETIC POLYNEUROPATHY, WITHOUT LONG-TERM CURRENT USE OF INSULIN (HCC): ICD-10-CM

## 2025-02-25 RX ORDER — GABAPENTIN 100 MG/1
100 CAPSULE ORAL 3 TIMES DAILY
Qty: 90 CAPSULE | Refills: 0 | Status: SHIPPED | OUTPATIENT
Start: 2025-02-25 | End: 2025-03-27

## 2025-02-25 NOTE — TELEPHONE ENCOUNTER
Please Approve or Refuse.  Send to Pharmacy per Pt's Request:      Next Visit Date:  4/21/2025   Last Visit Date: 1/15/2025    Hemoglobin A1C (%)   Date Value   01/15/2025 9.5   10/15/2024 9.2   07/15/2024 8.8 (A)             ( goal A1C is < 7)   BP Readings from Last 3 Encounters:   02/05/25 110/61   01/15/25 110/66   10/15/24 112/60          (goal 120/80)  BUN   Date Value Ref Range Status   06/19/2024 18 8 - 23 mg/dL Final     Creatinine   Date Value Ref Range Status   06/19/2024 0.7 0.5 - 0.9 mg/dL Final     Potassium   Date Value Ref Range Status   06/19/2024 4.0 3.7 - 5.3 mmol/L Final

## 2025-03-10 ENCOUNTER — HOSPITAL ENCOUNTER (OUTPATIENT)
Dept: PHYSICAL THERAPY | Age: 70
Setting detail: THERAPIES SERIES
Discharge: HOME | End: 2025-03-10
Attending: STUDENT IN AN ORGANIZED HEALTH CARE EDUCATION/TRAINING PROGRAM
Payer: COMMERCIAL

## 2025-03-10 PROCEDURE — 97113 AQUATIC THERAPY/EXERCISES: CPT

## 2025-03-10 NOTE — FLOWSHEET NOTE
[facility]   Outpatient Rehabilitation & Therapy  [address]  P: [phone]   F: [phone]    Physical Therapy Daily Treatment Note      Date:  3/10/2025  Patient Name:  Alma Strickland    :  1955  MRN: [id number]  Physician:      Sergey Jones DO                            Insurance: [facility]   Medical Diagnosis: M17.0 (ICD-10-CM) - Arthritis of both knees   Rehab Codes: R25 pain , M25.60 stiffness, R53.1 weakness   Onset Date: 2/3/25 (date of referral)                       Next 's appt: 25- PCP  Visit# / total visits: 2/10  Cancels/No Shows: 0/0    Subjective:  Patient reports high pain in R knee last night with aggravated with ROM last night.  States left knee pain is minimal this morning.    Pain:  [x] Yes  [] No Location: (B) knees   Pain Rating: (0-10 scale) 4/10 R knee; 1/10 L knee  Pain altered Tx:  [x] No  [] Yes  Action:  Comments: Initial aquatic therapy visit.  Educated on postural awareness, core stability and working in pain free ranges with all exercises.      Objective:    [facility]   Rehabilitation Services Exercise Log  Aquatic Knee phase 1    Date of Eval:  25                              Primary PT: Tim Wu, PT  PRECAUTIONS: memory issues -- getting work up for dementia     Date 3/10/25       Visit # 2/10       Walk F/L/R 2 Laps @ Rail       Marching 2 Laps       Squats 10x       Heel toe raises 10x       SLR F/L/R 10x       HS Curl 10x       Step-Ups F/L        Step Down F/L        Lunges F/L        Knee/Flex /Ext                Kickboard Ex. Small       Iso Abd.  10x5\"       Push-pull 10x       Paddling                Balance        Tandem        SLS                DEEP H2O        Cycling        Jacks        X-Country        Hang                Stretches        Achllies 2x20\"       Hamstring 2x20\"       Psoas        Quad                Cool Down 2 Laps @ Rail       Pain Rating 3            Specific

## 2025-03-17 ENCOUNTER — HOSPITAL ENCOUNTER (OUTPATIENT)
Dept: PHYSICAL THERAPY | Age: 70
Setting detail: THERAPIES SERIES
Discharge: HOME | End: 2025-03-17
Attending: STUDENT IN AN ORGANIZED HEALTH CARE EDUCATION/TRAINING PROGRAM
Payer: COMMERCIAL

## 2025-03-17 PROCEDURE — 97113 AQUATIC THERAPY/EXERCISES: CPT

## 2025-03-17 NOTE — FLOWSHEET NOTE
understanding.  [] Needs review.  [] Demonstrates/verbalizes HEP/Ed previously given.     Plan: [x] Continue per plan of care.   [] Other:      Treatment Charges: Mins Units Time in/Out   []  Modalities      []  Ther Exercise      []  Manual Therapy      []  Ther Activities      [x]  Aquatics 29 2    []  Neuromuscular      [] Vasocompression      [] Gait Training      [] Dry needling        [] 1 or 2 muscles        [] 3 or more muscles      []  Other      Total Billable time 29 2      Time In:  0740 am            Time Out:  0809    Electronically signed by:  Alejandro Herrera PTA

## 2025-03-20 DIAGNOSIS — K21.9 GASTROESOPHAGEAL REFLUX DISEASE WITHOUT ESOPHAGITIS: ICD-10-CM

## 2025-03-20 DIAGNOSIS — E11.42 TYPE 2 DIABETES MELLITUS WITH DIABETIC POLYNEUROPATHY, WITHOUT LONG-TERM CURRENT USE OF INSULIN (HCC): ICD-10-CM

## 2025-03-20 DIAGNOSIS — G25.81 RESTLESS LEG SYNDROME: ICD-10-CM

## 2025-03-20 RX ORDER — ROPINIROLE 0.5 MG/1
0.5 TABLET, FILM COATED ORAL NIGHTLY
Qty: 90 TABLET | Refills: 2 | Status: SHIPPED | OUTPATIENT
Start: 2025-03-20

## 2025-03-20 RX ORDER — FAMOTIDINE 20 MG/1
20 TABLET, FILM COATED ORAL 2 TIMES DAILY
Qty: 60 TABLET | Refills: 3 | Status: SHIPPED | OUTPATIENT
Start: 2025-03-20

## 2025-03-20 RX ORDER — GABAPENTIN 100 MG/1
100 CAPSULE ORAL 3 TIMES DAILY
Qty: 90 CAPSULE | Refills: 0 | Status: SHIPPED | OUTPATIENT
Start: 2025-03-20 | End: 2025-04-19

## 2025-03-20 NOTE — TELEPHONE ENCOUNTER
Please Approve or Refuse.  Send to Pharmacy per Pt's Request: Stephen Davenport I need refills on 4 Meds.   1. Famotidine 20mgTABS.   2 Furosemide  20 mg tabs twice daily/UNABLE TO PEND  3. Gabapentin  100 mg Capsules 3 times daily  4. Ropinirole 0.5 mg capsules 1 at night     Next Visit Date:  4/21/2025   Last Visit Date: 1/15/2025    Hemoglobin A1C (%)   Date Value   01/15/2025 9.5   10/15/2024 9.2   07/15/2024 8.8 (A)             ( goal A1C is < 7)   BP Readings from Last 3 Encounters:   02/05/25 110/61   01/15/25 110/66   10/15/24 112/60          (goal 120/80)  BUN   Date Value Ref Range Status   06/19/2024 18 8 - 23 mg/dL Final     Creatinine   Date Value Ref Range Status   06/19/2024 0.7 0.5 - 0.9 mg/dL Final     Potassium   Date Value Ref Range Status   06/19/2024 4.0 3.7 - 5.3 mmol/L Final

## 2025-03-24 ENCOUNTER — HOSPITAL ENCOUNTER (OUTPATIENT)
Dept: PHYSICAL THERAPY | Age: 70
Setting detail: THERAPIES SERIES
Discharge: HOME | End: 2025-03-24
Attending: STUDENT IN AN ORGANIZED HEALTH CARE EDUCATION/TRAINING PROGRAM
Payer: COMMERCIAL

## 2025-03-24 PROCEDURE — 97113 AQUATIC THERAPY/EXERCISES: CPT

## 2025-03-24 NOTE — FLOWSHEET NOTE
[facility]   Outpatient Rehabilitation & Therapy  [address]  P: [phone]   F: [phone]    Physical Therapy Daily Treatment Note    Date:  3/24/2025  Patient Name:  Alma Strickland    :  1955  MRN: [id number]  Physician:      Sergey Jones DO                            Insurance: [facility] [employer] [facility]   Medical Diagnosis: M17.0 (ICD-10-CM) - Arthritis of both knees   Rehab Codes: R25 pain , M25.60 stiffness, R53.1 weakness   Onset Date: 2/3/25 (date of referral)                       Next 's appt: 25- PCP  Visit# / total visits: 4/10 (corrected count)   Cancels/No Shows: 0/0    Subjective:  Patient reports having a good morning so far.  Notes decreased pain this morning with minimal pain.  Notes still getting \"cracking\" in right knee when first waking.  Notes less tightness and pain after last therapy visit.    Pain:  [x] Yes  [] No Location: (B) knees   Pain Rating: (0-10 scale) 4/10 R knee; 1/10 L knee  Pain altered Tx:  [x] No  [] Yes  Action:  Comments: reviewed postural awareness, core stability and working in pain free ranges with all exercises.      Objective:    [facility]   Rehabilitation Services Exercise Log  Aquatic Knee phase 1    Date of Eval:  25                              Primary PT: Tim Wu, PT  PRECAUTIONS: memory issues -- getting work up for dementia     Date 3/10/25 3/17/25 3/24/25     Visit # 2/10 3/10 4/10     Walk F/L/R 2 Laps @ Rail 2 Laps @ Rail 2 Laps @ Rail     Marching 2 Laps 2 Laps 2 Laps     Squats 10x 10x Low 12x5\"     Heel toe raises 10x 10x 12x     SLR F/L/R 10x 10x 12x     HS Curl 10x 10x 12x     Step-Ups F/L   Low 10x      Step Down F/L        Lunges F/L        Knee/Flex /Ext  10x 12x             Kickboard Ex. Small Small  Small   Low box     Iso Abd.  10x5\" 10x5\" 12x5\"     Push-pull 10x 10x 12x     Paddling                Balance        Tandem        SLS                DEEP H2O        Cycling

## 2025-04-04 ENCOUNTER — HOSPITAL ENCOUNTER (OUTPATIENT)
Dept: PHYSICAL THERAPY | Age: 70
Setting detail: THERAPIES SERIES
Discharge: HOME | End: 2025-04-04
Attending: STUDENT IN AN ORGANIZED HEALTH CARE EDUCATION/TRAINING PROGRAM
Payer: COMMERCIAL

## 2025-04-04 PROCEDURE — 97113 AQUATIC THERAPY/EXERCISES: CPT

## 2025-04-04 NOTE — FLOWSHEET NOTE
[facility]   Outpatient Rehabilitation & Therapy  [address]  P: [phone]   F: [phone]    Physical Therapy Daily Treatment Note    Date:  2025  Patient Name:  Alma Strickland    :  1955  MRN: [id number]  Physician:      Sergey Jones DO                            Insurance: [facility] [facility]   Medical Diagnosis: M17.0 (ICD-10-CM) - Arthritis of both knees   Rehab Codes: R25 pain , M25.60 stiffness, R53.1 weakness   Onset Date: 2/3/25 (date of referral)                       Next 's appt: 25- PCP  Visit# / total visits: 5/10    Cancels/No Shows: 0/0    Subjective:  Patient reports pain still higher in right but manageable at this time.  States pain levels have been down but still flares up with over activity and changes in weather.    Pain:  [x] Yes  [] No Location: (B) knees   Pain Rating: (0-10 scale) 4/10 R knee; 10 L knee  Pain altered Tx:  [x] No  [] Yes  Action:  Comments: reviewed postural awareness, core stability and working in pain free ranges with all exercises.      Objective:    [facility]   Rehabilitation Services Exercise Log  Aquatic Knee phase 1    Date of Eval:  25                              Primary PT: Tim Wu PT  PRECAUTIONS: memory issues -- getting work up for dementia     Date 3/10/25 3/17/25 3/24/25 4/4/25    Visit # 2/10 3/10 4/10 5/10    Walk F/L/R 2 Laps @ Rail 2 Laps @ Rail 2 Laps @ Rail 2 Laps @ Rail    Marching 2 Laps 2 Laps 2 Laps 2 Laps    Squats 10x 10x Low 12x5\" Low 12x5\"    Heel toe raises 10x 10x 12x 12x    SLR F/L/R 10x 10x 12x 12x    HS Curl 10x 10x 12x 12x    Step-Ups F/L   Low 10x  Low 12x    Step Down F/L     Add   Lunges F/L        Knee/Flex /Ext  10x 12x 12x            Kickboard Ex. Small Small  Small   Low box Small Low box    Iso Abd.  10x5\" 10x5\" 12x5\" 12x5\"    Push-pull 10x 10x 12x 12x    Paddling                Balance        Tandem        SLS                DEEP H2O

## 2025-04-07 ENCOUNTER — HOSPITAL ENCOUNTER (OUTPATIENT)
Dept: PHYSICAL THERAPY | Age: 70
Setting detail: THERAPIES SERIES
Discharge: HOME | End: 2025-04-07
Attending: STUDENT IN AN ORGANIZED HEALTH CARE EDUCATION/TRAINING PROGRAM
Payer: COMMERCIAL

## 2025-04-07 PROCEDURE — 97110 THERAPEUTIC EXERCISES: CPT

## 2025-04-07 PROCEDURE — 97113 AQUATIC THERAPY/EXERCISES: CPT

## 2025-04-07 NOTE — FLOWSHEET NOTE
[facility]   Outpatient Rehabilitation & Therapy  [address]  P: [phone]   F: [phone]    Physical Therapy Daily Treatment Note    Date:  2025  Patient Name:  Alma Strickland    :  1955  MRN: [id number]  Physician:      Sergey Jones DO                            Insurance: [facility]   Medical Diagnosis: M17.0 (ICD-10-CM) - Arthritis of both knees   Rehab Codes: R25 pain , M25.60 stiffness, R53.1 weakness   Onset Date: 2/3/25 (date of referral)                       Next 's appt: 25- PCP  Visit# / total visits: 6/10    Cancels/No Shows: 0/0    Subjective:  Patient reports feeling stiff and aching in (B) knees with cold weather this morning.  Denies an issued after last therapy visit.  Continues to report right knee is worse then left with stiffness and pain.    Pain:  [x] Yes  [] No Location: (B) knees   Pain Rating: (0-10 scale) 4/10 R knee; 2/10 L knee  Pain altered Tx:  [x] No  [] Yes  Action:  Comments: reviewed postural awareness, core stability and working in pain free ranges with all exercises.      Objective:    [facility]   Rehabilitation Services Exercise Log  Aquatic Knee phase 1    Date of Eval:  25                              Primary PT: Tim Wu PT  PRECAUTIONS: memory issues -- getting work up for dementia     Date 3/10/25 3/17/25 3/24/25 4/4/25 4/7/25   Visit # 2/10 3/10 4/10 5/10 6/10   Walk F/L/R 2 Laps @ Rail 2 Laps @ Rail 2 Laps @ Rail 2 Laps @ Rail 2 Laps @ Rail   Marching 2 Laps 2 Laps 2 Laps 2 Laps 2 Laps        Low Box for all LE exs   Squats 10x 10x Low 12x5\" Low 12x5\" 15x5\"   Heel toe raises 10x 10x 12x 12x 15x   SLR F/L/R 10x 10x 12x 12x 15x   HS Curl 10x 10x 12x 12x 15x   Step-Ups F/L   Low 10x  Low 12x Low 15x   Step Down F/L     Low 10x   Lunges F/L        Knee/Flex /Ext  10x 12x 12x 15x           Kickboard Ex. Small Small  Small   Low box Small Low box Small Low box   Iso Abd.  10x5\" 10x5\"

## 2025-04-07 NOTE — PROGRESS NOTES
[facility] Outpatient Physical Therapy  [address]         Phone: [phone]       Fax: [phone]    Physical Therapy Progress Note    Date: 2025      Patient: Alma Strickland  : 1955  MRN: [id number]    Physician:      Sergey Jones DO                            Insurance: [facility] [employer] [facility]   Medical Diagnosis: M17.0 (ICD-10-CM) - Arthritis of both knees   Rehab Codes: R25 pain , M25.60 stiffness, R53.1 weakness   Onset Date: 2/3/25 (date of referral)                       Next 's appt: 25- PCP  Visit# / total visits: 6/10                    Cancels/No Shows: 0/0  Date of initial visit: 25                Date of last Progress Note: 25 (visit 6)           Formal progress note performed today for reporting period 25- 25 .        Subjective: Pt notes she is continue to get stronger and working to moving the knees. She notes more of her pain is in the medial R knee. L knee only aches intermittent. She notes mobility is improving -- able to do steps better. She notes there is less sharp pain when doing a sit to stand. She mainly has it with initial sit to stand for the day. If she uses her arms there is no pain       Pain:  [x] Yes  [] No  Location: R knee    Pain Rating: (0-10 scale) 4/10  Pain altered Tx:  [] No  [] Yes  Action:  Comments:    Objective:  Test Measurements:          Range of Motion  Left Range of Motion  Right Strength  Left Strength  Right   Hip Flexion     5 5   Hip Abduction     4+ 4+   Hip Adduction     4+ 4+   Hip Extension     4+ 4+   Knee Flexion 120 123 5 5   Knee Extension 0 -6  More ache vs pain  5 4+   Dorsiflexion     5 5   Plantar flexion     5 5        Functional Testin/24 4/7    Functional Test: WOMAC  Score: Raw score of 48/96,  which is 50% functionally impaired  49/96 =51% functionally impaired         Treatment/Interventions:   Completed the above assessments of strength and ROM.

## 2025-04-14 ENCOUNTER — HOSPITAL ENCOUNTER (OUTPATIENT)
Dept: PHYSICAL THERAPY | Age: 70
Setting detail: THERAPIES SERIES
Discharge: HOME | End: 2025-04-14
Attending: STUDENT IN AN ORGANIZED HEALTH CARE EDUCATION/TRAINING PROGRAM
Payer: COMMERCIAL

## 2025-04-14 PROCEDURE — 97113 AQUATIC THERAPY/EXERCISES: CPT

## 2025-04-14 NOTE — FLOWSHEET NOTE
continue to update POC as needed       Pt. Education:  [x] Yes  [] No  [x] Reviewed Prior HEP/Ed-   Method of Education: [x] Verbal  [x] Demo  [] Written  Comprehension of Education:  [x] Verbalizes understanding.  [x] Demonstrates understanding.  [] Needs review.  [] Demonstrates/verbalizes HEP/Ed previously given.     Plan: [x] Continue per plan of care.   [] Other:       Treatment Charges: Mins Units Time in/Out   []  Modalities      []  Ther Exercise      []  Manual Therapy      []  Ther Activities      [x]  Aquatics 34 2    []  Neuromuscular      [] Vasocompression      [] Gait Training      [] Dry needling        [] 1 or 2 muscles        [] 3 or more muscles      []  Other      Total Billable time 34 2      Time In:  0801 am            Time Out:  0835 am      Electronically signed by:  Alejandro Herrera PTA

## 2025-04-16 ENCOUNTER — PATIENT MESSAGE (OUTPATIENT)
Dept: FAMILY MEDICINE CLINIC | Age: 70
End: 2025-04-16

## 2025-04-16 DIAGNOSIS — E55.9 VITAMIN D DEFICIENCY: ICD-10-CM

## 2025-04-16 DIAGNOSIS — I10 PRIMARY HYPERTENSION: ICD-10-CM

## 2025-04-16 RX ORDER — CHOLECALCIFEROL (VITAMIN D3) 1250 MCG
50000 CAPSULE ORAL WEEKLY
Qty: 12 CAPSULE | Refills: 1 | Status: SHIPPED | OUTPATIENT
Start: 2025-04-16

## 2025-04-16 RX ORDER — FUROSEMIDE 20 MG/1
20 TABLET ORAL 2 TIMES DAILY
Qty: 60 TABLET | Refills: 1 | Status: SHIPPED | OUTPATIENT
Start: 2025-04-16

## 2025-04-16 NOTE — TELEPHONE ENCOUNTER
Please Approve or Refuse.  Send to Pharmacy per Pt's Request: concepción      Next Visit Date:  4/21/2025   Last Visit Date: 1/15/2025    Hemoglobin A1C (%)   Date Value   01/15/2025 9.5   10/15/2024 9.2   07/15/2024 8.8 (A)             ( goal A1C is < 7)   BP Readings from Last 3 Encounters:   02/05/25 110/61   01/15/25 110/66   10/15/24 112/60          (goal 120/80)  BUN   Date Value Ref Range Status   06/19/2024 18 8 - 23 mg/dL Final     Creatinine   Date Value Ref Range Status   06/19/2024 0.7 0.5 - 0.9 mg/dL Final     Potassium   Date Value Ref Range Status   06/19/2024 4.0 3.7 - 5.3 mmol/L Final

## 2025-04-21 ENCOUNTER — OFFICE VISIT (OUTPATIENT)
Dept: FAMILY MEDICINE CLINIC | Age: 70
End: 2025-04-21
Payer: COMMERCIAL

## 2025-04-21 ENCOUNTER — HOSPITAL ENCOUNTER (OUTPATIENT)
Dept: PHYSICAL THERAPY | Age: 70
Setting detail: THERAPIES SERIES
Discharge: HOME | End: 2025-04-21
Attending: STUDENT IN AN ORGANIZED HEALTH CARE EDUCATION/TRAINING PROGRAM
Payer: COMMERCIAL

## 2025-04-21 VITALS
BODY MASS INDEX: 32.39 KG/M2 | SYSTOLIC BLOOD PRESSURE: 130 MMHG | HEART RATE: 74 BPM | WEIGHT: 165 LBS | OXYGEN SATURATION: 97 % | HEIGHT: 60 IN | DIASTOLIC BLOOD PRESSURE: 80 MMHG | TEMPERATURE: 97.8 F

## 2025-04-21 DIAGNOSIS — M81.0 AGE-RELATED OSTEOPOROSIS WITHOUT CURRENT PATHOLOGICAL FRACTURE: ICD-10-CM

## 2025-04-21 DIAGNOSIS — Z71.89 ACP (ADVANCE CARE PLANNING): ICD-10-CM

## 2025-04-21 DIAGNOSIS — E55.9 VITAMIN D DEFICIENCY: ICD-10-CM

## 2025-04-21 DIAGNOSIS — F32.5 MAJOR DEPRESSIVE DISORDER WITH SINGLE EPISODE, IN FULL REMISSION: ICD-10-CM

## 2025-04-21 DIAGNOSIS — E78.2 MIXED HYPERLIPIDEMIA: ICD-10-CM

## 2025-04-21 DIAGNOSIS — I10 PRIMARY HYPERTENSION: ICD-10-CM

## 2025-04-21 DIAGNOSIS — E11.9 TYPE 2 DIABETES MELLITUS WITHOUT COMPLICATION, WITHOUT LONG-TERM CURRENT USE OF INSULIN (HCC): ICD-10-CM

## 2025-04-21 DIAGNOSIS — Z12.11 COLON CANCER SCREENING: ICD-10-CM

## 2025-04-21 DIAGNOSIS — Z00.00 INITIAL MEDICARE ANNUAL WELLNESS VISIT: Primary | ICD-10-CM

## 2025-04-21 LAB — HBA1C MFR BLD: 7.4 %

## 2025-04-21 PROCEDURE — 99214 OFFICE O/P EST MOD 30 MIN: CPT | Performed by: FAMILY MEDICINE

## 2025-04-21 PROCEDURE — 99497 ADVNCD CARE PLAN 30 MIN: CPT | Performed by: FAMILY MEDICINE

## 2025-04-21 PROCEDURE — 3079F DIAST BP 80-89 MM HG: CPT | Performed by: FAMILY MEDICINE

## 2025-04-21 PROCEDURE — 3075F SYST BP GE 130 - 139MM HG: CPT | Performed by: FAMILY MEDICINE

## 2025-04-21 PROCEDURE — 3051F HG A1C>EQUAL 7.0%<8.0%: CPT | Performed by: FAMILY MEDICINE

## 2025-04-21 PROCEDURE — 1126F AMNT PAIN NOTED NONE PRSNT: CPT | Performed by: FAMILY MEDICINE

## 2025-04-21 PROCEDURE — 83036 HEMOGLOBIN GLYCOSYLATED A1C: CPT | Performed by: FAMILY MEDICINE

## 2025-04-21 PROCEDURE — 1160F RVW MEDS BY RX/DR IN RCRD: CPT | Performed by: FAMILY MEDICINE

## 2025-04-21 PROCEDURE — 1123F ACP DISCUSS/DSCN MKR DOCD: CPT | Performed by: FAMILY MEDICINE

## 2025-04-21 PROCEDURE — G0438 PPPS, INITIAL VISIT: HCPCS | Performed by: FAMILY MEDICINE

## 2025-04-21 PROCEDURE — 97110 THERAPEUTIC EXERCISES: CPT

## 2025-04-21 PROCEDURE — 1159F MED LIST DOCD IN RCRD: CPT | Performed by: FAMILY MEDICINE

## 2025-04-21 SDOH — ECONOMIC STABILITY: FOOD INSECURITY: WITHIN THE PAST 12 MONTHS, YOU WORRIED THAT YOUR FOOD WOULD RUN OUT BEFORE YOU GOT MONEY TO BUY MORE.: NEVER TRUE

## 2025-04-21 SDOH — ECONOMIC STABILITY: FOOD INSECURITY: WITHIN THE PAST 12 MONTHS, THE FOOD YOU BOUGHT JUST DIDN'T LAST AND YOU DIDN'T HAVE MONEY TO GET MORE.: NEVER TRUE

## 2025-04-21 ASSESSMENT — PATIENT HEALTH QUESTIONNAIRE - PHQ9
6. FEELING BAD ABOUT YOURSELF - OR THAT YOU ARE A FAILURE OR HAVE LET YOURSELF OR YOUR FAMILY DOWN: NOT AT ALL
1. LITTLE INTEREST OR PLEASURE IN DOING THINGS: NOT AT ALL
8. MOVING OR SPEAKING SO SLOWLY THAT OTHER PEOPLE COULD HAVE NOTICED. OR THE OPPOSITE, BEING SO FIGETY OR RESTLESS THAT YOU HAVE BEEN MOVING AROUND A LOT MORE THAN USUAL: NOT AT ALL
3. TROUBLE FALLING OR STAYING ASLEEP: NOT AT ALL
SUM OF ALL RESPONSES TO PHQ QUESTIONS 1-9: 0
7. TROUBLE CONCENTRATING ON THINGS, SUCH AS READING THE NEWSPAPER OR WATCHING TELEVISION: NOT AT ALL
9. THOUGHTS THAT YOU WOULD BE BETTER OFF DEAD, OR OF HURTING YOURSELF: NOT AT ALL
5. POOR APPETITE OR OVEREATING: NOT AT ALL
SUM OF ALL RESPONSES TO PHQ QUESTIONS 1-9: 0
SUM OF ALL RESPONSES TO PHQ QUESTIONS 1-9: 0
4. FEELING TIRED OR HAVING LITTLE ENERGY: NOT AT ALL
SUM OF ALL RESPONSES TO PHQ QUESTIONS 1-9: 0
2. FEELING DOWN, DEPRESSED OR HOPELESS: NOT AT ALL
10. IF YOU CHECKED OFF ANY PROBLEMS, HOW DIFFICULT HAVE THESE PROBLEMS MADE IT FOR YOU TO DO YOUR WORK, TAKE CARE OF THINGS AT HOME, OR GET ALONG WITH OTHER PEOPLE: NOT DIFFICULT AT ALL

## 2025-04-21 ASSESSMENT — ENCOUNTER SYMPTOMS
BACK PAIN: 1
RHINORRHEA: 0
ABDOMINAL DISTENTION: 0
DIARRHEA: 0
TROUBLE SWALLOWING: 0
CHEST TIGHTNESS: 0
SHORTNESS OF BREATH: 0
SORE THROAT: 0
EYE REDNESS: 0
VOMITING: 0
COUGH: 0
WHEEZING: 0
RECTAL PAIN: 0
ABDOMINAL PAIN: 0
COLOR CHANGE: 0
SINUS PRESSURE: 0
CONSTIPATION: 0
BLOOD IN STOOL: 0
NAUSEA: 0
STRIDOR: 0

## 2025-04-21 NOTE — FLOWSHEET NOTE
[facility] Outpatient Physical Therapy   [address]   Phone: [phone]   Fax: [phone]    Physical Therapy Daily Treatment Note      Date:  2025  Patient Name:  Alma Strickland    :  1955  MRN: [id number]  Physician:      Sergey Jones DO                            Insurance: [facility] [employer] [facility]   Medical Diagnosis: M17.0 (ICD-10-CM) - Arthritis of both knees   Rehab Codes: R25 pain , M25.60 stiffness, R53.1 weakness   Onset Date: 2/3/25 (date of referral)                       Next 's appt: 25- PCP  Visit# / total visits: 7/10                    Cancels/No Shows: 0/0      Precautions: memory issues -- getting work up for dementia      Subjective:  Pt notes with rainy weather her R knee is hurting her more today. Rates at about 4/10. Denies any falls        Pain:  [x] Yes  [] No Location: R knee  Pain Rating: (0-10 scale) 410  Pain altered Tx:  [x] No  [] Yes  Action:  Comments:    Objective:  INTERVENTIONS  INTERVENTIONS  Reps/ Time Weight/ Level Completed  Today Comments          MODALITIES                      MANUAL                      EXERCISES        Mat level        Glute bridges  2x10   x     SKFO  2x10 ea  Red  x    Hooklying march  2x10 ea  Red  X     Sidelying clamshell               Standing        Heel/toe raises  20x   x    3 way hip  2x10 ea  Yellow  x    Minisquats  2x10   x Cues for form to be a little more posterior    Step ups;  fwd & lat 10x  6 in  x    HS stretch  2x30s  6 in  x    Modified SLS  3x20s ea   x Front foot on a step, no hands    Resisted side stepping  3 L  Yellow  x           Other:    Patient Education/Home Program :   : --      Pt. Education:  [] Yes  [x] No  [] Reviewed Prior HEP/Ed  Method of Education: [] Verbal  [] Demo  [] Written  Comprehension of Education:  [] Verbalizes understanding.  [] Demonstrates understanding.  [] Needs review.  [] Demonstrates/verbalizes HEP/Ed previously

## 2025-04-21 NOTE — PROGRESS NOTES
Medicare Annual Wellness Visit    Alma Strickland is here for Medicare AWV  The patient (or guardian, if applicable) and other individuals in attendance with the patient were advised that Artificial Intelligence will be utilized during this visit to record, process the conversation to generate a clinical note, and support improvement of the AI technology. The patient (or guardian, if applicable) and other individuals in attendance at the appointment consented to the use of AI, including the recording.                  Assessment & Plan  1. Health maintenance.  Her blood pressure readings are within the normal range. Her memory function appears to be intact. She does not consume alcohol. Overall, her health status is satisfactory. She is advised to utilize a walker for mobility if she finds the cane insufficiently supportive. She is encouraged to engage in cognitive exercises such as writing and puzzle-solving. A stool kit will be provided for colon cancer screening, which she can return at her convenience. A cholesterol test has been ordered due to previously elevated levels. Blood work will be ordered during her next visit in 3 months.    2. Diabetes Mellitus.  Her blood sugar levels have been excellent, ranging from 109 to 125. Her A1c has decreased from 9.5 to 7.4. She is currently taking 15 units of insulin, glipizide, metformin, and Januvia. She will continue with her current medication regimen.    3. Neuropathy.  She experiences neuropathy in her feet, which sometimes makes her feel unsteady while walking. She uses a cane and has a walker at home. She is advised to use the walker if the cane is not sufficiently helpful to prevent falls.    4. Dementia.  She reports that her memory has not been too bad and it does not significantly affect her day-to-day activities. She has seen a neurologist who ordered an MRI of the brain. No medication has been prescribed yet. She is advised to write things down and do

## 2025-04-28 ENCOUNTER — HOSPITAL ENCOUNTER (OUTPATIENT)
Dept: PHYSICAL THERAPY | Age: 70
Setting detail: THERAPIES SERIES
Discharge: HOME | End: 2025-04-28
Attending: STUDENT IN AN ORGANIZED HEALTH CARE EDUCATION/TRAINING PROGRAM
Payer: COMMERCIAL

## 2025-04-28 PROCEDURE — 97110 THERAPEUTIC EXERCISES: CPT

## 2025-04-28 NOTE — FLOWSHEET NOTE
[facility] Outpatient Physical Therapy   [address]   Phone: [phone]   Fax: [phone]    Physical Therapy Daily Treatment Note      Date:  2025  Patient Name:  Alma Strickland    :  1955  MRN: [id number]  Physician:      Sergey Jones DO                            Insurance: [facility] [employer] [facility]   Medical Diagnosis: M17.0 (ICD-10-CM) - Arthritis of both knees   Rehab Codes: R25 pain , M25.60 stiffness, R53.1 weakness   Onset Date: 2/3/25 (date of referral)                       Next 's appt: 25- PCP  Visit# / total visits: 8/10                    Cancels/No Shows: 0/0      Precautions: memory issues -- getting work up for dementia      Subjective:  Pt notes R knee continues to be achey. She notes yesterday had episode of shin pain but not today. She notes she has not been doing HEP.     Pain:  [x] Yes  [] No Location: R knee  Pain Rating: (0-10 scale) 4/10  Pain altered Tx:  [x] No  [] Yes  Action:  Comments:    Objective:  INTERVENTIONS  INTERVENTIONS  Reps/ Time Weight/ Level Completed  Today Comments          MODALITIES                      MANUAL                      EXERCISES        Mat level        Glute bridges  2x10   x     SKFO  2x10 ea  Red  x    Hooklying march  2x10 ea  Red  X     Sidelying clamshell  15x ea  Red  x           Standing        Heel/toe raises  20x   x    3 way hip  2x10 ea  Red  x    Minisquats  2x10   x Cues for form to be a little more posterior    Step ups;  fwd & lat 10x  6 in  x    HS stretch  2x30s  6 in  x    SLS  3x20s ea   x Light UE support     Resisted side stepping  3 L  Yellow      HS curls  2x10 ea  2#  x    Marches  2x10 ea  2#  x    Tandem stance  2x20s ea   x Light UE support as needed    Other:    Patient Education/Home Program :   : --    : Access Code: [email or web address] -- Exercises-- red theraband   - Supine Bridge  - 1 x daily - 7 x weekly - 2 sets - 10 reps  - Hooklying Single Leg Bent Knee Fallouts

## 2025-05-05 ENCOUNTER — HOSPITAL ENCOUNTER (OUTPATIENT)
Dept: PHYSICAL THERAPY | Age: 70
Setting detail: THERAPIES SERIES
Discharge: HOME | End: 2025-05-05
Attending: STUDENT IN AN ORGANIZED HEALTH CARE EDUCATION/TRAINING PROGRAM
Payer: COMMERCIAL

## 2025-05-05 PROCEDURE — 97110 THERAPEUTIC EXERCISES: CPT

## 2025-05-05 NOTE — THERAPY DISCHARGE
[facility] Outpatient Physical Therapy   [address]   Phone: [phone]   Fax: [phone]    Physical Therapy Daily Treatment Note/Progress Note/ Therapy discharge       Date:  2025  Patient Name:  Alma Strickland    :  1955  MRN: [id number]  Physician:      Sergey Jones DO                            Insurance: [facility] [employer] [facility]   Medical Diagnosis: M17.0 (ICD-10-CM) - Arthritis of both knees   Rehab Codes: R25 pain , M25.60 stiffness, R53.1 weakness   Onset Date: 2/3/25 (date of referral)                       Next 's appt: 25- PCP  Visit# / total visits: 10/10  (corrected)                  Cancels/No Shows: 0/0      Precautions: memory issues -- getting work up for dementia      Subjective:  Pt arrives 13 minutes late.  Notes she is doing HEP with no questions. Still having some  soreness in the medial R knee. Feels about 4/10. Feeling weather  is a major factor.  She saw PCP which noted to just take ibuprofen.   Feels the steps are easier for her to Complete. Denies any falls.     Pain:  [x] Yes  [] No Location: R knee  Pain Rating: (0-10 scale) 4/10  Pain altered Tx:  [x] No  [] Yes  Action:  Comments:    Objective:  INTERVENTIONS  INTERVENTIONS  Reps/ Time Weight/ Level Completed  Today Comments          MODALITIES                      MANUAL                      EXERCISES        Mat level        Glute bridges  2x10   x     SKFO  2x10 ea  Red  x    Hooklying march  2x10 ea  Red  X     Sidelying clamshell  15x ea  Red  x           Standing        Heel/toe raises  20x   x    3 way hip  2x10 ea  Red  x    Minisquats  2x10    Cues for form to be a little more posterior    Step ups;  fwd & lat 10x  6 in      HS stretch  2x30s  6 in      SLS  3x20s ea    Light UE support     Resisted side stepping  3 L  Yellow      HS curls  2x10 ea  2#      Marches  2x10 ea  2#      Tandem stance  2x20s ea    Light UE support as needed    Other:    Patient

## 2025-05-08 DIAGNOSIS — Z12.11 COLON CANCER SCREENING: ICD-10-CM

## 2025-05-08 LAB
CONTROL: PRESENT
FECAL BLOOD IMMUNOCHEMICAL TEST: POSITIVE

## 2025-05-08 PROCEDURE — 82274 ASSAY TEST FOR BLOOD FECAL: CPT | Performed by: FAMILY MEDICINE

## 2025-05-09 ENCOUNTER — RESULTS FOLLOW-UP (OUTPATIENT)
Dept: FAMILY MEDICINE CLINIC | Age: 70
End: 2025-05-09

## 2025-05-09 DIAGNOSIS — R19.5 POSITIVE FIT (FECAL IMMUNOCHEMICAL TEST): Primary | ICD-10-CM

## 2025-05-14 ENCOUNTER — PREP FOR PROCEDURE (OUTPATIENT)
Dept: GASTROENTEROLOGY | Age: 70
End: 2025-05-14

## 2025-05-14 ENCOUNTER — TELEPHONE (OUTPATIENT)
Dept: GASTROENTEROLOGY | Age: 70
End: 2025-05-14

## 2025-05-14 NOTE — TELEPHONE ENCOUNTER
Procedure scheduled/Dr Houston  Procedure: colonoscopy  Dx:  + FIT  Date: 06/04/25  Time: 12:30pm/arrive 10:30am  Hospital: [facility]  PAT phone call: INDU  Bowel Prep instructions given: Miralax/Dulcolax  In office/via phone: phone/mailed bowel prep instructions/MyChart  Clearance needed: none  GLP-1: none

## 2025-05-21 DIAGNOSIS — E11.42 TYPE 2 DIABETES MELLITUS WITH DIABETIC POLYNEUROPATHY, WITHOUT LONG-TERM CURRENT USE OF INSULIN (HCC): ICD-10-CM

## 2025-05-21 RX ORDER — GABAPENTIN 100 MG/1
100 CAPSULE ORAL 3 TIMES DAILY
Qty: 90 CAPSULE | Refills: 0 | Status: SHIPPED | OUTPATIENT
Start: 2025-05-21 | End: 2025-06-20

## 2025-05-21 NOTE — TELEPHONE ENCOUNTER
Please Approve or Refuse.  Send to Pharmacy per Pt's Request:      Next Visit Date:  7/21/2025   Last Visit Date: 4/21/2025    Hemoglobin A1C (%)   Date Value   04/21/2025 7.4   01/15/2025 9.5   10/15/2024 9.2             ( goal A1C is < 7)   BP Readings from Last 3 Encounters:   04/21/25 130/80   02/05/25 110/61   01/15/25 110/66          (goal 120/80)  BUN   Date Value Ref Range Status   06/19/2024 18 8 - 23 mg/dL Final     Creatinine   Date Value Ref Range Status   06/19/2024 0.7 0.5 - 0.9 mg/dL Final     Potassium   Date Value Ref Range Status   06/19/2024 4.0 3.7 - 5.3 mmol/L Final

## 2025-05-23 DIAGNOSIS — E11.42 TYPE 2 DIABETES MELLITUS WITH DIABETIC POLYNEUROPATHY, WITHOUT LONG-TERM CURRENT USE OF INSULIN (HCC): ICD-10-CM

## 2025-05-23 RX ORDER — METFORMIN HYDROCHLORIDE 500 MG/1
1000 TABLET, EXTENDED RELEASE ORAL 2 TIMES DAILY
Qty: 120 TABLET | Refills: 0 | Status: SHIPPED | OUTPATIENT
Start: 2025-05-23

## 2025-06-02 NOTE — PRE-PROCEDURE INSTRUCTIONS
Have you received your Prep? Any questions with prep instructions?   Only Clear Liquid Diet day before.  Nothing to eat after midnight day before procedure.  Are you taking any blood thinners? If so, you need to Stop.  Remove any jewelry and body piercings.  Do you wear glasses? If so, please bring a case to store them in.  Are you having any Covid symptoms?  Do you have any new rashes, infections, etc. that we should be aware of?  Do you have a ride home the day of surgery? It cannot be a cab or medical transportation.  Verify surgery time/date and what time to arrive at hospital.  NO ANSWER, VM LEFT TO ARRIVE AT 1030

## 2025-06-03 ENCOUNTER — ANESTHESIA EVENT (OUTPATIENT)
Dept: ENDOSCOPY | Age: 70
End: 2025-06-03
Payer: COMMERCIAL

## 2025-06-04 ENCOUNTER — ANESTHESIA (OUTPATIENT)
Dept: ENDOSCOPY | Age: 70
End: 2025-06-04
Payer: COMMERCIAL

## 2025-06-04 ENCOUNTER — HOSPITAL ENCOUNTER (OUTPATIENT)
Age: 70
Setting detail: OUTPATIENT SURGERY
Discharge: HOME | End: 2025-06-04
Attending: INTERNAL MEDICINE | Admitting: INTERNAL MEDICINE
Payer: COMMERCIAL

## 2025-06-04 VITALS
SYSTOLIC BLOOD PRESSURE: 130 MMHG | OXYGEN SATURATION: 96 % | DIASTOLIC BLOOD PRESSURE: 76 MMHG | TEMPERATURE: 98.4 F | RESPIRATION RATE: 15 BRPM | WEIGHT: 165 LBS | HEART RATE: 108 BPM | HEIGHT: 60 IN | BODY MASS INDEX: 32.39 KG/M2

## 2025-06-04 DIAGNOSIS — R19.5 POSITIVE COLORECTAL CANCER SCREENING USING COLOGUARD TEST: ICD-10-CM

## 2025-06-04 LAB — GLUCOSE BLD-MCNC: 128 MG/DL (ref 65–105)

## 2025-06-04 PROCEDURE — 2580000003 HC RX 258: Performed by: ANESTHESIOLOGY

## 2025-06-04 PROCEDURE — 2709999900 HC NON-CHARGEABLE SUPPLY: Performed by: INTERNAL MEDICINE

## 2025-06-04 PROCEDURE — 6360000002 HC RX W HCPCS: Performed by: ANESTHESIOLOGY

## 2025-06-04 PROCEDURE — 82947 ASSAY GLUCOSE BLOOD QUANT: CPT

## 2025-06-04 PROCEDURE — 3609010600 HC COLONOSCOPY POLYPECTOMY SNARE/COLD BIOPSY: Performed by: INTERNAL MEDICINE

## 2025-06-04 PROCEDURE — 6370000000 HC RX 637 (ALT 250 FOR IP): Performed by: NURSE ANESTHETIST, CERTIFIED REGISTERED

## 2025-06-04 PROCEDURE — 3700000001 HC ADD 15 MINUTES (ANESTHESIA): Performed by: INTERNAL MEDICINE

## 2025-06-04 PROCEDURE — 45385 COLONOSCOPY W/LESION REMOVAL: CPT | Performed by: INTERNAL MEDICINE

## 2025-06-04 PROCEDURE — 3700000000 HC ANESTHESIA ATTENDED CARE: Performed by: INTERNAL MEDICINE

## 2025-06-04 PROCEDURE — 6360000002 HC RX W HCPCS: Performed by: NURSE ANESTHETIST, CERTIFIED REGISTERED

## 2025-06-04 PROCEDURE — 88305 TISSUE EXAM BY PATHOLOGIST: CPT

## 2025-06-04 PROCEDURE — 7100000011 HC PHASE II RECOVERY - ADDTL 15 MIN: Performed by: INTERNAL MEDICINE

## 2025-06-04 PROCEDURE — 7100000010 HC PHASE II RECOVERY - FIRST 15 MIN: Performed by: INTERNAL MEDICINE

## 2025-06-04 RX ORDER — SODIUM CHLORIDE 0.9 % (FLUSH) 0.9 %
5-40 SYRINGE (ML) INJECTION EVERY 12 HOURS SCHEDULED
Status: DISCONTINUED | OUTPATIENT
Start: 2025-06-04 | End: 2025-06-04 | Stop reason: HOSPADM

## 2025-06-04 RX ORDER — ASPIRIN 81 MG/1
81 TABLET ORAL DAILY
COMMUNITY

## 2025-06-04 RX ORDER — SIMETHICONE 40MG/0.6ML
SUSPENSION, DROPS(FINAL DOSAGE FORM)(ML) ORAL PRN
Status: DISCONTINUED | OUTPATIENT
Start: 2025-06-04 | End: 2025-06-04 | Stop reason: ALTCHOICE

## 2025-06-04 RX ORDER — PROPOFOL 10 MG/ML
INJECTION, EMULSION INTRAVENOUS
Status: DISCONTINUED | OUTPATIENT
Start: 2025-06-04 | End: 2025-06-04 | Stop reason: SDUPTHER

## 2025-06-04 RX ORDER — SODIUM CHLORIDE 9 MG/ML
INJECTION, SOLUTION INTRAVENOUS CONTINUOUS
Status: DISCONTINUED | OUTPATIENT
Start: 2025-06-04 | End: 2025-06-04 | Stop reason: HOSPADM

## 2025-06-04 RX ORDER — LIDOCAINE HYDROCHLORIDE 10 MG/ML
1 INJECTION, SOLUTION EPIDURAL; INFILTRATION; INTRACAUDAL; PERINEURAL
Status: COMPLETED | OUTPATIENT
Start: 2025-06-04 | End: 2025-06-04

## 2025-06-04 RX ORDER — SODIUM CHLORIDE 9 MG/ML
INJECTION, SOLUTION INTRAVENOUS PRN
Status: DISCONTINUED | OUTPATIENT
Start: 2025-06-04 | End: 2025-06-04 | Stop reason: HOSPADM

## 2025-06-04 RX ORDER — ALBUTEROL SULFATE 90 UG/1
INHALANT RESPIRATORY (INHALATION)
Status: DISCONTINUED | OUTPATIENT
Start: 2025-06-04 | End: 2025-06-04 | Stop reason: SDUPTHER

## 2025-06-04 RX ORDER — SODIUM CHLORIDE 0.9 % (FLUSH) 0.9 %
5-40 SYRINGE (ML) INJECTION PRN
Status: DISCONTINUED | OUTPATIENT
Start: 2025-06-04 | End: 2025-06-04 | Stop reason: HOSPADM

## 2025-06-04 RX ADMIN — SODIUM CHLORIDE: 9 INJECTION, SOLUTION INTRAVENOUS at 11:40

## 2025-06-04 RX ADMIN — LIDOCAINE HYDROCHLORIDE 1 ML: 10 INJECTION, SOLUTION EPIDURAL; INFILTRATION; INTRACAUDAL; PERINEURAL at 11:39

## 2025-06-04 RX ADMIN — PROPOFOL 100 MCG/KG/MIN: 10 INJECTION, EMULSION INTRAVENOUS at 12:58

## 2025-06-04 RX ADMIN — ALBUTEROL SULFATE 4 PUFF: 90 AEROSOL, METERED RESPIRATORY (INHALATION) at 13:10

## 2025-06-04 RX ADMIN — PROPOFOL 50 MG: 10 INJECTION, EMULSION INTRAVENOUS at 12:57

## 2025-06-04 ASSESSMENT — PAIN - FUNCTIONAL ASSESSMENT: PAIN_FUNCTIONAL_ASSESSMENT: 0-10

## 2025-06-04 ASSESSMENT — ENCOUNTER SYMPTOMS
COUGH: 0
SORE THROAT: 0
SHORTNESS OF BREATH: 0

## 2025-06-04 ASSESSMENT — PAIN DESCRIPTION - DESCRIPTORS: DESCRIPTORS: CRAMPING

## 2025-06-04 NOTE — ANESTHESIA POSTPROCEDURE EVALUATION
Department of Anesthesiology  Postprocedure Note    Patient: Alma Strickland  MRN: [id number]  YOB: 1955  Date of evaluation: 6/4/2025    Procedure Summary       Date: 06/04/25 Room / Location: [place] [address] / [facility]    Anesthesia Start: 1252 Anesthesia Stop: 1350    Procedure: COLONOSCOPY POLYPECTOMY HOT SNARE/BIOPSY Diagnosis:       Positive colorectal cancer screening using Cologuard test      (Positive colorectal cancer screening using Cologuard test [R19.5])    Surgeons: Andrés Houston MD Responsible Provider: Joss Lawrence MD    Anesthesia Type: general, TIVA ASA Status: 3            Anesthesia Type: No value filed.    Gino Phase I: Gino Score: 10    Gino Phase II: Gino Score: 10    Anesthesia Post Evaluation    Patient location during evaluation: PACU  Patient participation: complete - patient participated  Level of consciousness: awake and alert  Airway patency: patent  Nausea & Vomiting: no vomiting  Cardiovascular status: hemodynamically stable  Respiratory status: acceptable  Hydration status: euvolemic  Comments: POST- ANESTHESIA EVALUATION       Pt Name: Alma Strickland  MRN: [id number]  YOB: 1955  Date of evaluation: 6/4/2025  Time:  3:31 PM      /76   Pulse (!) 108   Temp 98.4 °F (36.9 °C) (Infrared)   Resp 15   Ht 1.524 m (5')   Wt 74.8 kg (165 lb)   SpO2 96%   BMI 32.22 kg/m²      Consciousness Level  Awake  Cardiopulmonary Status  Stable  Pain Adequately Treated YES  Nausea / Vomiting  NO  Adequate Hydration  YES  Anesthesia Related Complications NONE      Electronically signed by Joss Lawrence MD on 6/4/2025 at 3:31 PM         Pain management: satisfactory to patient    No notable events documented.

## 2025-06-04 NOTE — H&P
HISTORY and PHYSICAL  [facility]       NAME:  Alma Strickland  MRN: [id number]   YOB: 1955   Date: 6/4/2025   Age: 69 y.o.  Gender: female       COMPLAINT AND PRESENT HISTORY:       Alma Strickland is 69 y.o.  female, here for     Procedure(s):  COLONOSCOPY DIAGNOSTIC    Pre-Op Diagnosis Codes:      * Positive colorectal cancer screening using Cologuard test [R19.5]    Pt has not had previous colonoscopy.   Denies abdominal pain.  Pt has some dysphagia. Denies any particular foods that are worse to swallow.  Pt has occasional heartburn.   Denies nausea, vomiting.  Pt has occasional diarrhea, constipation.   Denies blood in stool, dark tarry stools.  Denies changes in appetite and unintended weight loss.  Denies family history of colon cancer.    Completed and followed prescribed prep. NPO p MN. Stopped aspirin and ibuprofen one week ago. Denies taking any blood thinning medications. Denies recent or current chest pain/pressure, palpitations, SOB, recent URI, fever or chills.     RECENT LABS, IMAGING AND TESTING     Lab Results   Component Value Date    WBC 7.2 06/19/2024    RBC 4.68 06/19/2024    HGB 13.9 06/19/2024    HCT 41.2 06/19/2024    MCV 88.1 06/19/2024    MCH 29.7 06/19/2024    MCHC 33.7 06/19/2024    RDW 13.7 06/19/2024     06/19/2024    MPV 7.2 06/19/2024        Lab Results   Component Value Date     06/19/2024    K 4.0 06/19/2024     06/19/2024    CO2 20 06/19/2024    BUN 18 06/19/2024    CREATININE 0.7 06/19/2024    GLUCOSE 211 (H) 06/19/2024    CALCIUM 9.3 06/19/2024    BILITOT 0.4 06/19/2024    ALKPHOS 80 06/19/2024    AST 15 06/19/2024    ALT 21 06/19/2024       No results found.    PAST MEDICAL HISTORY     Past Medical History:   Diagnosis Date    Anxiety     Asthma     Breast cancer (HCC)     Depression     History of breast cancer     RIGHT BREAST    Hyperlipidemia     Murmur, cardiac     Neuropathy     Osteoarthritis     Restless legs syndrome   colorectal cancer screening using Cologuard test 04/01/2024    Gastroesophageal reflux disease without esophagitis 04/01/2024    Primary hypertension 04/01/2024    Vitamin D deficiency 04/01/2024           ZIYAD Sneed CNP on 6/4/2025 at 6:37 AM

## 2025-06-04 NOTE — ANESTHESIA PRE PROCEDURE
Department of Anesthesiology  Preprocedure Note       Name:  Alma Strickland   Age:  69 y.o.  :  1955                                          MRN:  [id number]         Date:  2025      Surgeon: Surgeon(s):  Andrés Houston MD    Procedure: Procedure(s):  COLONOSCOPY DIAGNOSTIC    Medications prior to admission:   Prior to Admission medications    Medication Sig Start Date End Date Taking? Authorizing Provider   aspirin 81 MG EC tablet Take 1 tablet by mouth daily   Yes Provider, MD Edwige   Cholecalciferol (VITAMIN D3) 1.25 MG (38868 UT) CAPS Take 1 capsule by mouth once a week 25  Yes Les Walker MD   venlafaxine (EFFEXOR XR) 75 MG extended release capsule TAKE 1 CAPSULE BY MOUTH EVERY EVENING 25  Yes Les Walker MD   alendronate (FOSAMAX) 70 MG tablet Take 1 tablet by mouth every 7 days 1/15/25  Yes Les Walker MD   ibuprofen (IBU) 600 MG tablet Take 1 tablet by mouth every 8 hours as needed for Pain 10/6/24  Yes Vinay Mclean DO   metFORMIN (GLUCOPHAGE-XR) 500 MG extended release tablet TAKE 2 TABLETS BY MOUTH TWICE DAILY 25   Selene Baxter MD   gabapentin (NEURONTIN) 100 MG capsule Take 1 capsule by mouth 3 times daily for 30 days. 25  Les Walker MD   furosemide (LASIX) 20 MG tablet Take 1 tablet by mouth 2 times daily 25   Les Walker MD   famotidine (PEPCID) 20 MG tablet Take 1 tablet by mouth 2 times daily TAKE 1 TABLET BY MOUTH TWICE DAILY AS NEEDED FOR GERD 3/20/25   Les Walker MD   rOPINIRole (REQUIP) 0.5 MG tablet Take 1 tablet by mouth nightly 3/20/25   Les Walker MD   Continuous Glucose Sensor (FREESTYLE BRODY 3 SENSOR) [facility] Continue to monitor blood sugars. 25   Les Walker MD   captopril (CAPOTEN) 12.5 MG tablet TAKE 1 TABLET BY MOUTH DAILY 25   Les Walker MD   fenofibrate micronized (LOFIBRA) 67 MG capsule Take 1 capsule by mouth every morning (before breakfast) 1/15/25   Les Walker,

## 2025-06-04 NOTE — OP NOTE
PROCEDURE NOTE    DATE OF PROCEDURE: 6/4/2025    SURGEON: Andrés Houston MD  Facility : [facility]  ASSISTANT: None  Anesthesia: Monitored anesthesia care  PREOPERATIVE DIAGNOSIS: Positive cologuard test    POSTOPERATIVE DIAGNOSIS: as described below    OPERATION: Total colonoscopy     ANESTHESIA: Moderate Sedation    ESTIMATED BLOOD LOSS: less than 50     COMPLICATIONS: None.     SPECIMENS:  Was Obtained: colon polyps    HISTORY: The patient is a 69 y.o. year old female with history of above preop diagnosis.  I recommended colonoscopy with possible biopsy or polypectomy and I explained the risk, benefits, expected outcome, and alternatives to the procedure.  Risks included but are not limited to bleeding, infection, respiratory distress, hypotension, and perforation of the colon and possibility of missing a lesion.  The patient understands and is in agreement.        PROCEDURE: The patient was given IV conscious sedation.  The patient's SPO2 remained above 90% throughout the procedure.     Digital rectal exam- normal    The colonoscope was inserted per rectum and advanced under direct vision to the cecum with difficulty due to redundant colon.      Post sedation note :The patient's SPO2 remained above 90% throughout the procedure.the vital signs remained stable , and no immediate complication form the procedure noted, patient will be ready for d/c when criteria is met .        The prep was good.      Findings:  Terminal ileum: Distil 1 cm examined normal    Cecum: abnormal: 5 polyps- 4 mm to 7 mm each removed with hot snare    Ascending colon: normal    Transverse colon: normal    Descending/Sigmoid colon: normal    Rectum/Anus: examined in normal and retroflexed positions and was normal; hemorrhoids small    Withdrawal Time was (minutes): 15    Diverticulosis: none    The colon was decompressed and the scope was removed.  The patient tolerated the procedure well.     Impression: Cecal polyps S/p  polypectomy    Recommendations/Plan:   Lifestyle and dietary modifications as discussed  Continue current medications  F/U Biopsies  F/U In Office No  Repeat colonoscopy in 3 years    Electronically signed by Andrés Houston MD  on 6/4/2025 at 1:38 PM

## 2025-06-09 LAB — SURGICAL PATHOLOGY REPORT: NORMAL

## 2025-06-12 ENCOUNTER — RESULTS FOLLOW-UP (OUTPATIENT)
Dept: GASTROENTEROLOGY | Age: 70
End: 2025-06-12

## 2025-06-12 ENCOUNTER — PATIENT MESSAGE (OUTPATIENT)
Dept: GASTROENTEROLOGY | Age: 70
End: 2025-06-12

## 2025-06-12 NOTE — TELEPHONE ENCOUNTER
----- Message from Dr. Andrés Houston MD sent at 6/12/2025 11:34 AM EDT -----  Polyp histology shows that it is a tubular adenoma. As per current guidelines, the patient would need repeat colonoscopy in 3 years.  Andrés  ----- Message -----  From: Frances Fuentes Incoming Lab Results From [facility]  Sent: 6/9/2025  11:40 AM EDT  To: Andrés Houston MD

## 2025-06-22 DIAGNOSIS — E11.42 TYPE 2 DIABETES MELLITUS WITH DIABETIC POLYNEUROPATHY, WITHOUT LONG-TERM CURRENT USE OF INSULIN (HCC): ICD-10-CM

## 2025-06-23 RX ORDER — METFORMIN HYDROCHLORIDE 500 MG/1
1000 TABLET, EXTENDED RELEASE ORAL 2 TIMES DAILY
Qty: 120 TABLET | Refills: 0 | Status: SHIPPED | OUTPATIENT
Start: 2025-06-23

## 2025-06-23 NOTE — TELEPHONE ENCOUNTER
Please Approve or Refuse.  Send to Pharmacy per Pt's Request:      Next Visit Date:  7/21/2025   Last Visit Date: 4/21/2025    Hemoglobin A1C (%)   Date Value   04/21/2025 7.4   01/15/2025 9.5   10/15/2024 9.2             ( goal A1C is < 7)   BP Readings from Last 3 Encounters:   06/04/25 130/76   04/21/25 130/80   02/05/25 110/61          (goal 120/80)  BUN   Date Value Ref Range Status   06/19/2024 18 8 - 23 mg/dL Final     Creatinine   Date Value Ref Range Status   06/19/2024 0.7 0.5 - 0.9 mg/dL Final     Potassium   Date Value Ref Range Status   06/19/2024 4.0 3.7 - 5.3 mmol/L Final

## 2025-06-29 DIAGNOSIS — G25.81 RESTLESS LEG SYNDROME: ICD-10-CM

## 2025-06-29 DIAGNOSIS — E11.42 TYPE 2 DIABETES MELLITUS WITH DIABETIC POLYNEUROPATHY, WITHOUT LONG-TERM CURRENT USE OF INSULIN (HCC): ICD-10-CM

## 2025-06-29 DIAGNOSIS — I10 PRIMARY HYPERTENSION: ICD-10-CM

## 2025-06-30 RX ORDER — CAPTOPRIL 12.5 MG/1
12.5 TABLET ORAL DAILY
Qty: 90 TABLET | Refills: 1 | Status: SHIPPED | OUTPATIENT
Start: 2025-06-30

## 2025-06-30 RX ORDER — GABAPENTIN 100 MG/1
100 CAPSULE ORAL 3 TIMES DAILY
Qty: 90 CAPSULE | Refills: 0 | Status: SHIPPED | OUTPATIENT
Start: 2025-06-30 | End: 2025-07-30

## 2025-06-30 RX ORDER — ROPINIROLE 0.5 MG/1
0.5 TABLET, FILM COATED ORAL NIGHTLY
Qty: 90 TABLET | Refills: 2 | Status: SHIPPED | OUTPATIENT
Start: 2025-06-30

## 2025-07-21 ENCOUNTER — OFFICE VISIT (OUTPATIENT)
Dept: FAMILY MEDICINE CLINIC | Age: 70
End: 2025-07-21
Payer: COMMERCIAL

## 2025-07-21 VITALS
WEIGHT: 167.2 LBS | HEART RATE: 70 BPM | SYSTOLIC BLOOD PRESSURE: 108 MMHG | OXYGEN SATURATION: 97 % | TEMPERATURE: 97.8 F | HEIGHT: 60 IN | BODY MASS INDEX: 32.83 KG/M2 | DIASTOLIC BLOOD PRESSURE: 66 MMHG

## 2025-07-21 DIAGNOSIS — I10 PRIMARY HYPERTENSION: ICD-10-CM

## 2025-07-21 DIAGNOSIS — E55.9 VITAMIN D DEFICIENCY: ICD-10-CM

## 2025-07-21 DIAGNOSIS — F32.5 MAJOR DEPRESSIVE DISORDER WITH SINGLE EPISODE, IN FULL REMISSION: ICD-10-CM

## 2025-07-21 DIAGNOSIS — M81.0 AGE-RELATED OSTEOPOROSIS WITHOUT CURRENT PATHOLOGICAL FRACTURE: ICD-10-CM

## 2025-07-21 DIAGNOSIS — R19.5 POSITIVE COLORECTAL CANCER SCREENING USING COLOGUARD TEST: ICD-10-CM

## 2025-07-21 DIAGNOSIS — E78.2 MIXED HYPERLIPIDEMIA: ICD-10-CM

## 2025-07-21 DIAGNOSIS — E11.42 TYPE 2 DIABETES MELLITUS WITH DIABETIC POLYNEUROPATHY, WITHOUT LONG-TERM CURRENT USE OF INSULIN (HCC): Primary | ICD-10-CM

## 2025-07-21 DIAGNOSIS — Z12.31 ENCOUNTER FOR SCREENING MAMMOGRAM FOR HIGH-RISK PATIENT: ICD-10-CM

## 2025-07-21 LAB — HBA1C MFR BLD: 6.5 %

## 2025-07-21 PROCEDURE — 1123F ACP DISCUSS/DSCN MKR DOCD: CPT | Performed by: FAMILY MEDICINE

## 2025-07-21 PROCEDURE — 3044F HG A1C LEVEL LT 7.0%: CPT | Performed by: FAMILY MEDICINE

## 2025-07-21 PROCEDURE — 3074F SYST BP LT 130 MM HG: CPT | Performed by: FAMILY MEDICINE

## 2025-07-21 PROCEDURE — 99214 OFFICE O/P EST MOD 30 MIN: CPT | Performed by: FAMILY MEDICINE

## 2025-07-21 PROCEDURE — 1125F AMNT PAIN NOTED PAIN PRSNT: CPT | Performed by: FAMILY MEDICINE

## 2025-07-21 PROCEDURE — 1160F RVW MEDS BY RX/DR IN RCRD: CPT | Performed by: FAMILY MEDICINE

## 2025-07-21 PROCEDURE — 1159F MED LIST DOCD IN RCRD: CPT | Performed by: FAMILY MEDICINE

## 2025-07-21 PROCEDURE — 83036 HEMOGLOBIN GLYCOSYLATED A1C: CPT | Performed by: FAMILY MEDICINE

## 2025-07-21 PROCEDURE — 3078F DIAST BP <80 MM HG: CPT | Performed by: FAMILY MEDICINE

## 2025-07-21 RX ORDER — TETANUS AND DIPHTHERIA TOXOIDS ADSORBED 2; 2 [LF]/.5ML; [LF]/.5ML
0.5 INJECTION INTRAMUSCULAR ONCE
Qty: 0.5 ML | Refills: 0 | Status: CANCELLED | OUTPATIENT
Start: 2025-07-21 | End: 2025-07-21

## 2025-07-21 RX ORDER — INSULIN GLARGINE 300 U/ML
15 INJECTION, SOLUTION SUBCUTANEOUS
Qty: 10 ADJUSTABLE DOSE PRE-FILLED PEN SYRINGE | Refills: 1 | Status: SHIPPED | OUTPATIENT
Start: 2025-07-21

## 2025-07-21 RX ORDER — TETANUS AND DIPHTHERIA TOXOIDS ADSORBED 2; 2 [LF]/.5ML; [LF]/.5ML
0.5 INJECTION INTRAMUSCULAR ONCE
Qty: 0.5 ML | Refills: 0 | OUTPATIENT
Start: 2025-07-21 | End: 2025-07-21

## 2025-07-21 RX ORDER — INSULIN GLARGINE 300 U/ML
15 INJECTION, SOLUTION SUBCUTANEOUS NIGHTLY
Qty: 10 ADJUSTABLE DOSE PRE-FILLED PEN SYRINGE | Refills: 1 | Status: SHIPPED | OUTPATIENT
Start: 2025-07-21 | End: 2025-07-21 | Stop reason: SDUPTHER

## 2025-07-21 SDOH — ECONOMIC STABILITY: FOOD INSECURITY: WITHIN THE PAST 12 MONTHS, THE FOOD YOU BOUGHT JUST DIDN'T LAST AND YOU DIDN'T HAVE MONEY TO GET MORE.: NEVER TRUE

## 2025-07-21 SDOH — ECONOMIC STABILITY: FOOD INSECURITY: WITHIN THE PAST 12 MONTHS, YOU WORRIED THAT YOUR FOOD WOULD RUN OUT BEFORE YOU GOT MONEY TO BUY MORE.: NEVER TRUE

## 2025-07-21 ASSESSMENT — ENCOUNTER SYMPTOMS
COUGH: 0
RHINORRHEA: 0
WHEEZING: 0
COLOR CHANGE: 0
EYE REDNESS: 0
STRIDOR: 0
SORE THROAT: 0
VOMITING: 0
NAUSEA: 0
CONSTIPATION: 0
ABDOMINAL PAIN: 0
BACK PAIN: 1
CHEST TIGHTNESS: 0
SHORTNESS OF BREATH: 0
DIARRHEA: 0
ABDOMINAL DISTENTION: 0
SINUS PRESSURE: 0
TROUBLE SWALLOWING: 0
BLOOD IN STOOL: 0

## 2025-07-21 ASSESSMENT — PATIENT HEALTH QUESTIONNAIRE - PHQ9
4. FEELING TIRED OR HAVING LITTLE ENERGY: NOT AT ALL
SUM OF ALL RESPONSES TO PHQ QUESTIONS 1-9: 0
1. LITTLE INTEREST OR PLEASURE IN DOING THINGS: NOT AT ALL
9. THOUGHTS THAT YOU WOULD BE BETTER OFF DEAD, OR OF HURTING YOURSELF: NOT AT ALL
SUM OF ALL RESPONSES TO PHQ QUESTIONS 1-9: 0
7. TROUBLE CONCENTRATING ON THINGS, SUCH AS READING THE NEWSPAPER OR WATCHING TELEVISION: NOT AT ALL
3. TROUBLE FALLING OR STAYING ASLEEP: NOT AT ALL
SUM OF ALL RESPONSES TO PHQ QUESTIONS 1-9: 0
6. FEELING BAD ABOUT YOURSELF - OR THAT YOU ARE A FAILURE OR HAVE LET YOURSELF OR YOUR FAMILY DOWN: NOT AT ALL
10. IF YOU CHECKED OFF ANY PROBLEMS, HOW DIFFICULT HAVE THESE PROBLEMS MADE IT FOR YOU TO DO YOUR WORK, TAKE CARE OF THINGS AT HOME, OR GET ALONG WITH OTHER PEOPLE: NOT DIFFICULT AT ALL
SUM OF ALL RESPONSES TO PHQ QUESTIONS 1-9: 0
5. POOR APPETITE OR OVEREATING: NOT AT ALL
2. FEELING DOWN, DEPRESSED OR HOPELESS: NOT AT ALL
8. MOVING OR SPEAKING SO SLOWLY THAT OTHER PEOPLE COULD HAVE NOTICED. OR THE OPPOSITE, BEING SO FIGETY OR RESTLESS THAT YOU HAVE BEEN MOVING AROUND A LOT MORE THAN USUAL: NOT AT ALL

## 2025-07-21 NOTE — PROGRESS NOTES
Visit Information    Have you changed or started any medications since your last visit including any over-the-counter medicines, vitamins, or herbal medicines? NO   Have you stopped taking any of your medications? Is so, why? -  no  Are you having any side effects from any of your medications? - no    Have you seen any other physician or provider since your last visit?  yes -    Have you had any other diagnostic tests since your last visit?  yes -    Have you been seen in the emergency room and/or had an admission in a hospital since we last saw you?  no   Have you had your routine dental cleaning in the past 6 months?  no     Do you have an active MyChart account? If no, what is the barrier?  Yes    Patient Care Team:  Les Walker MD as PCP - General (Family Medicine)  Les Walker MD as PCP - Empaneled Provider    Medical History Review  Past Medical, Family, and Social History reviewed and does contribute to the patient presenting condition    Health Maintenance   Topic Date Due    Diabetic retinal exam  Never done    DTaP/Tdap/Td vaccine (1 - Tdap) Never done    Shingles vaccine (1 of 2) Never done    Respiratory Syncytial Virus (RSV) Pregnant or age 60 yrs+ (1 - Risk 60-74 years 1-dose series) Never done    COVID-19 Vaccine (4 - 2024-25 season) 09/01/2024    Diabetic Alb to Cr ratio (uACR) test  06/19/2025    Lipids  06/19/2025    GFR test (Diabetes, CKD 3-4, OR last GFR 15-59)  06/19/2025    Diabetic foot exam  07/15/2025    Breast cancer screen  07/29/2025    Flu vaccine (1) 08/01/2025    A1C test (Diabetic or Prediabetic)  04/21/2026    Depression Monitoring  04/21/2026    Colorectal Cancer Screen  06/04/2035    DEXA (modify frequency per FRAX score)  Completed    Annual Wellness Visit (Medicare Advantage)  Completed    Pneumococcal 50+ years Vaccine  Completed    Hepatitis C screen  Completed    Hepatitis A vaccine  Aged Out    Hepatitis B vaccine  Aged Out    Hib vaccine  Aged Out    Polio vaccine

## 2025-07-21 NOTE — PROGRESS NOTES
Chief Complaint   Patient presents with    Diabetes     A1c due    Hypertension    Hyperlipidemia     The patient (or guardian, if applicable) and other individuals in attendance with the patient were advised that Artificial Intelligence will be utilized during this visit to record, process the conversation to generate a clinical note, and support improvement of the AI technology. The patient (or guardian, if applicable) and other individuals in attendance at the appointment consented to the use of AI, including the recording.                    Diabetes (A1c due), Hypertension, and Hyperlipidemia      History of Present Illness  The patient is scheduled for a follow-up on type 2 diabetes and chronic medical problems. She is accompanied by her daughter.    She reports experiencing low blood sugar levels, which is a new occurrence for her. Her blood sugar readings have been fluctuating, with values of 41 on 07/05/2025, 81 on 07/06/2025, 101 on 07/08/2025, 110 on 07/09/2025, 50 on 07/10/2025, 58 on 07/13/2025, 83 on 07/14/2025, and 79 on 07/15/2025. These readings were taken in the early morning. This morning, her blood sugar was 99. She also mentions that her blood sugar dropped to 40 one morning, causing her to feel dizzy upon sitting up. She is currently taking 15 units of insulin at night, along with metformin, Januvia, and glipizide. She has been paying $400 for Januvia for the past 6 months.  Patient is due for diabetic eye exam, discussed with patient to schedule appointment.  A1c today in the office has decreased to 6.5, will discontinue Januvia due to cost of medication.    She has not yet undergone a mammogram and is due for one by the end of the month. She recently had a colonoscopy where a couple of polyps were removed.      Hyperlipidemia well-controlled is due for blood work no recent with panel.  Patient is currently on statin reports compliance.    Hypertension controlled on current treatment.  Patient

## 2025-07-22 DIAGNOSIS — E11.42 TYPE 2 DIABETES MELLITUS WITH DIABETIC POLYNEUROPATHY, WITHOUT LONG-TERM CURRENT USE OF INSULIN (HCC): ICD-10-CM

## 2025-07-22 RX ORDER — METFORMIN HYDROCHLORIDE 500 MG/1
1000 TABLET, EXTENDED RELEASE ORAL 2 TIMES DAILY
Qty: 120 TABLET | Refills: 0 | Status: SHIPPED | OUTPATIENT
Start: 2025-07-22

## 2025-07-22 NOTE — TELEPHONE ENCOUNTER
Please Approve or Refuse.  Send to Pharmacy per Pt's Request:      Next Visit Date:  Visit date not found   Last Visit Date: 7/21/2025    Hemoglobin A1C (%)   Date Value   07/21/2025 6.5   04/21/2025 7.4   01/15/2025 9.5             ( goal A1C is < 7)   BP Readings from Last 3 Encounters:   07/21/25 108/66   06/04/25 130/76   04/21/25 130/80          (goal 120/80)  BUN   Date Value Ref Range Status   06/19/2024 18 8 - 23 mg/dL Final     Creatinine   Date Value Ref Range Status   06/19/2024 0.7 0.5 - 0.9 mg/dL Final     Potassium   Date Value Ref Range Status   06/19/2024 4.0 3.7 - 5.3 mmol/L Final

## 2025-07-25 DIAGNOSIS — E78.2 MIXED HYPERLIPIDEMIA: ICD-10-CM

## 2025-07-25 RX ORDER — FENOFIBRATE 67 MG/1
67 CAPSULE ORAL
Qty: 90 CAPSULE | Refills: 1 | Status: SHIPPED | OUTPATIENT
Start: 2025-07-25

## 2025-08-18 ENCOUNTER — PATIENT MESSAGE (OUTPATIENT)
Dept: FAMILY MEDICINE CLINIC | Age: 70
End: 2025-08-18

## 2025-08-18 DIAGNOSIS — E11.42 TYPE 2 DIABETES MELLITUS WITH DIABETIC POLYNEUROPATHY, WITHOUT LONG-TERM CURRENT USE OF INSULIN (HCC): ICD-10-CM

## 2025-08-18 RX ORDER — GLIPIZIDE 5 MG/1
5 TABLET ORAL
Qty: 180 TABLET | Refills: 2 | Status: SHIPPED | OUTPATIENT
Start: 2025-08-18

## 2025-08-26 ENCOUNTER — PATIENT MESSAGE (OUTPATIENT)
Dept: FAMILY MEDICINE CLINIC | Age: 70
End: 2025-08-26

## 2025-08-26 DIAGNOSIS — K21.9 GASTROESOPHAGEAL REFLUX DISEASE WITHOUT ESOPHAGITIS: ICD-10-CM

## 2025-08-26 RX ORDER — FAMOTIDINE 20 MG/1
20 TABLET, FILM COATED ORAL 2 TIMES DAILY
Qty: 60 TABLET | Refills: 3 | Status: SHIPPED | OUTPATIENT
Start: 2025-08-26

## (undated) DEVICE — GAUZE,SPONGE,4"X4",16PLY,STRL,LF,10/TRAY: Brand: MEDLINE

## (undated) DEVICE — SNARE ENDO CAPTIVATOR W10MM X L240CM RND INSUL STIFF-UOM BOX OF 10

## (undated) DEVICE — GOWN,POLY REINFORCED,LG: Brand: MEDLINE

## (undated) DEVICE — ERBE NESSY® OMEGA PLATE USA (85+23)CM² , WITH CABLE 3 M: Brand: ERBE

## (undated) DEVICE — POLYP TRAP: Brand: TRAPEASE®

## (undated) DEVICE — ENDOSCOPIC KIT 1.1+ 10 FT OP4 CA DE